# Patient Record
Sex: MALE | HISPANIC OR LATINO | Employment: OTHER | URBAN - METROPOLITAN AREA
[De-identification: names, ages, dates, MRNs, and addresses within clinical notes are randomized per-mention and may not be internally consistent; named-entity substitution may affect disease eponyms.]

---

## 2017-03-13 ENCOUNTER — OFFICE VISIT (OUTPATIENT)
Dept: FAMILY MEDICINE | Facility: CLINIC | Age: 42
End: 2017-03-13
Payer: COMMERCIAL

## 2017-03-13 VITALS
BODY MASS INDEX: 23.13 KG/M2 | HEART RATE: 73 BPM | SYSTOLIC BLOOD PRESSURE: 108 MMHG | HEIGHT: 75 IN | TEMPERATURE: 98.2 F | WEIGHT: 186 LBS | RESPIRATION RATE: 20 BRPM | DIASTOLIC BLOOD PRESSURE: 69 MMHG

## 2017-03-13 DIAGNOSIS — R05.9 COUGH: ICD-10-CM

## 2017-03-13 DIAGNOSIS — J02.9 VIRAL PHARYNGITIS: Primary | ICD-10-CM

## 2017-03-13 LAB
DEPRECATED S PYO AG THROAT QL EIA: NORMAL
MICRO REPORT STATUS: NORMAL
SPECIMEN SOURCE: NORMAL

## 2017-03-13 PROCEDURE — 99203 OFFICE O/P NEW LOW 30 MIN: CPT | Performed by: FAMILY MEDICINE

## 2017-03-13 PROCEDURE — 87880 STREP A ASSAY W/OPTIC: CPT | Performed by: FAMILY MEDICINE

## 2017-03-13 PROCEDURE — 87081 CULTURE SCREEN ONLY: CPT | Performed by: FAMILY MEDICINE

## 2017-03-13 RX ORDER — BENZONATATE 100 MG/1
100 CAPSULE ORAL 3 TIMES DAILY PRN
Qty: 42 CAPSULE | Refills: 0 | Status: SHIPPED | OUTPATIENT
Start: 2017-03-13 | End: 2017-07-25

## 2017-03-13 NOTE — PROGRESS NOTES
"  SUBJECTIVE:                                                    Virginia Presley is a 41 year old male who presents to clinic today for the following health issues:      Acute Illness   Acute illness concerns: cough and chest congestion, ST  Onset: x1 week    Fever: YES    Chills/Sweats: YES    Headache (location?): YES    Sinus Pressure:YES    Conjunctivitis:  no    Ear Pain: no    Rhinorrhea: no    Congestion: YES    Sore Throat: YES     Cough: YES    Wheeze: no    Decreased Appetite: no    Nausea: no    Vomiting: no    Diarrhea:  no    Dysuria/Freq.: no    Fatigue/Achiness: YES    Sick/Strep Exposure: no     Therapies Tried and outcome: cold medicine, vicks     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       Problem list and histories reviewed & adjusted, as indicated.  Additional history: as documented    There is no problem list on file for this patient.    Past Surgical History   Procedure Laterality Date     Wrist surgery       Tonsillectomy       Back surgery       fusion - 6- 8 yrs ago       Social History   Substance Use Topics     Smoking status: Never Smoker     Smokeless tobacco: Never Used     Alcohol use No     Family History   Problem Relation Age of Onset     Lymphoma Father 85           Reviewed and updated as needed this visit by clinical staff  Tobacco  Allergies  Soc Hx      Reviewed and updated as needed this visit by Provider         ROS:  Constitutional, HEENT, cardiovascular, pulmonary, gi and gu systems are negative, except as otherwise noted.    OBJECTIVE:                                                    /69 (BP Location: Right arm, Patient Position: Chair, Cuff Size: Adult Large)  Pulse 73  Temp 98.2  F (36.8  C) (Oral)  Resp 20  Ht 6' 3\" (1.905 m)  Wt 186 lb (84.4 kg)  BMI 23.25 kg/m2  Body mass index is 23.25 kg/(m^2).  GENERAL: healthy, alert and no distress  HENT: ear canals and TM's normal, nose and mouth without ulcers or lesions  RESP: lungs clear to " auscultation - no rales, rhonchi or wheezes  CV: regular rate and rhythm, normal S1 S2, no S3 or S4, no murmur, click or rub, no peripheral edema and peripheral pulses strong    Diagnostic Test Results:  Results for orders placed or performed in visit on 03/13/17 (from the past 24 hour(s))   Rapid strep screen   Result Value Ref Range    Specimen Description Throat     Rapid Strep A Screen       NEGATIVE: No Group A streptococcal antigen detected by immunoassay, await   culture report.      Micro Report Status FINAL 03/13/2017         ASSESSMENT/PLAN:                                                        1. Viral pharyngitis  - Rapid strep screen- negative.   - Beta strep group A culture    2. Cough  - will start on   - benzonatate (TESSALON) 100 MG capsule; Take 1 capsule (100 mg) by mouth 3 times daily as needed  Dispense: 42 capsule; Refill: 0    See Patient Instructions    Elizabet Alejandro MD  [facility]

## 2017-03-13 NOTE — PATIENT INSTRUCTIONS
Increase fluids  Humidifier use at night  Recommend allergy tablet daily such as claritin, zyrtec or allegra  Follow up as needed  Viral Upper Respiratory Illness (Adult)  You have a viral upper respiratory illness (URI), which is another term for the common cold. This illness is contagious during the first few days. It is spread through the air by coughing and sneezing. It may also be spread by direct contact (touching the sick person and then touching your own eyes, nose, or mouth). Frequent handwashing will decrease risk of spread. Most viral illnesses go away within 7 to 10 days with rest and simple home remedies. Sometimes the illness may last for several weeks. Antibiotics will not kill a virus, and they are generally not prescribed for this condition.    Home care    If symptoms are severe, rest at home for the first 2 to 3 days. When you resume activity, don't let yourself get too tired.    Avoid being exposed to cigarette smoke (yours or others ).    You may use acetaminophen or ibuprofen to control pain and fever, unless another medicine was prescribed. (Note: If you have chronic liver or kidney disease, have ever had a stomach ulcer or gastrointestinal bleeding, or are taking blood-thinning medicines, talk with your healthcare provider before using these medicines.) Aspirin should never be given to anyone under 18 years of age who is ill with a viral infection or fever. It may cause severe liver or brain damage.    Your appetite may be poor, so a light diet is fine. Avoid dehydration by drinking 6 to 8 glasses of fluids per day (water, soft drinks, juices, tea, or soup). Extra fluids will help loosen secretions in the nose and lungs.    Over-the-counter cold medicines will not shorten the length of time you re sick, but they may be helpful for the following symptoms: cough, sore throat, and nasal and sinus congestion. (Note: Do not use decongestants if you have high blood pressure.)  Follow-up  care  Follow up with your healthcare provider, or as advised.  When to seek medical advice  Call your healthcare provider right away if any of these occur:    Cough with lots of colored sputum (mucus)    Severe headache; face, neck, or ear pain    Difficulty swallowing due to throat pain    Fever of 100.4 F (38 C)  Call 911, or get immediate medical care  Call emergency services right away if any of these occur:    Chest pain, shortness of breath, wheezing, or difficulty breathing    Coughing up blood    Inability to swallow due to throat pain    3621-0162 [employer]. [address] [place] [address]184. All rights reserved. This information is not intended as a substitute for professional medical care. Always follow your healthcare professional's instructions.

## 2017-03-13 NOTE — MR AVS SNAPSHOT
After Visit Summary   3/13/2017    Virginia Presley    MRN: [id number]           Patient Information     Date Of Birth          1975        Visit Information        Provider Department      3/13/2017 2:15 PM Elizabet Alejandro MD [facility]        Today's Diagnoses     Viral pharyngitis    -  1    Cough          Care Instructions      Increase fluids  Humidifier use at night  Recommend allergy tablet daily such as claritin, zyrtec or allegra  Follow up as needed  Viral Upper Respiratory Illness (Adult)  You have a viral upper respiratory illness (URI), which is another term for the common cold. This illness is contagious during the first few days. It is spread through the air by coughing and sneezing. It may also be spread by direct contact (touching the sick person and then touching your own eyes, nose, or mouth). Frequent handwashing will decrease risk of spread. Most viral illnesses go away within 7 to 10 days with rest and simple home remedies. Sometimes the illness may last for several weeks. Antibiotics will not kill a virus, and they are generally not prescribed for this condition.    Home care    If symptoms are severe, rest at home for the first 2 to 3 days. When you resume activity, don't let yourself get too tired.    Avoid being exposed to cigarette smoke (yours or others ).    You may use acetaminophen or ibuprofen to control pain and fever, unless another medicine was prescribed. (Note: If you have chronic liver or kidney disease, have ever had a stomach ulcer or gastrointestinal bleeding, or are taking blood-thinning medicines, talk with your healthcare provider before using these medicines.) Aspirin should never be given to anyone under 18 years of age who is ill with a viral infection or fever. It may cause severe liver or brain damage.    Your appetite may be poor, so a light diet is fine. Avoid dehydration by drinking 6 to 8 glasses of fluids per day  (water, soft drinks, juices, tea, or soup). Extra fluids will help loosen secretions in the nose and lungs.    Over-the-counter cold medicines will not shorten the length of time you re sick, but they may be helpful for the following symptoms: cough, sore throat, and nasal and sinus congestion. (Note: Do not use decongestants if you have high blood pressure.)  Follow-up care  Follow up with your healthcare provider, or as advised.  When to seek medical advice  Call your healthcare provider right away if any of these occur:    Cough with lots of colored sputum (mucus)    Severe headache; face, neck, or ear pain    Difficulty swallowing due to throat pain    Fever of 100.4 F (38 C)  Call 911, or get immediate medical care  Call emergency services right away if any of these occur:    Chest pain, shortness of breath, wheezing, or difficulty breathing    Coughing up blood    Inability to swallow due to throat pain    6959-9853 [employer]. [address] [place] [address]. All rights reserved. This information is not intended as a substitute for professional medical care. Always follow your healthcare professional's instructions.              Follow-ups after your visit        Who to contact     If you have questions or need follow up information about today's clinic visit or your schedule please contact [facility] directly at [phone].  Normal or non-critical lab and imaging results will be communicated to you by MyChart, letter or phone within 4 business days after the clinic has received the results. If you do not hear from us within 7 days, please contact the clinic through MyChart or phone. If you have a critical or abnormal lab result, we will notify you by phone as soon as possible.  Submit refill requests through [employer] or call your pharmacy and they will forward the refill request to us. Please allow 3 business days for your refill to be completed.           "Additional Information About Your Visit        MyChart Information     [email or web address] lets you send messages to your doctor, view your test results, renew your prescriptions, schedule appointments and more. To sign up, go to [email or web address] . Click on \"Log in\" on the left side of the screen, which will take you to the Welcome page. Then click on \"Sign up Now\" on the right side of the page.     You will be asked to enter the access code listed below, as well as some personal information. Please follow the directions to create your username and password.     Your access code is: [email or web address]  Expires: 2017  3:03 PM     Your access code will  in 90 days. If you need help or a new code, please call your [place] clinic or [phone].        Care EveryWhere ID     This is your Care EveryWhere ID. This could be used by other organizations to access your [place] medical records  [id number]        Your Vitals Were     Pulse Temperature Respirations Height BMI (Body Mass Index)       73 98.2  F (36.8  C) (Oral) 20 6' 3\" (1.905 m) 23.25 kg/m2        Blood Pressure from Last 3 Encounters:   17 108/69    Weight from Last 3 Encounters:   17 186 lb (84.4 kg)              We Performed the Following     Beta strep group A culture     Rapid strep screen          Today's Medication Changes          These changes are accurate as of: 3/13/17  3:03 PM.  If you have any questions, ask your nurse or doctor.               Start taking these medicines.        Dose/Directions    benzonatate 100 MG capsule   Commonly known as:  TESSALON   Used for:  Cough   Started by:  Elizabet Alejandro MD        Dose:  100 mg   Take 1 capsule (100 mg) by mouth 3 times daily as needed   Quantity:  42 capsule   Refills:  0            Where to get your medicines      These medications were sent to [facility] [place] [place] [id number] [place] [id number]     Phone:  " [phone]     benzonatate 100 MG capsule                Primary Care Provider    None Specified       No primary provider on file.        Thank you!     Thank you for choosing [facility]  for your care. Our goal is always to provide you with excellent care. Hearing back from our patients is one way we can continue to improve our services. Please take a few minutes to complete the written survey that you may receive in the mail after your visit with us. Thank you!             Your Updated Medication List - Protect others around you: Learn how to safely use, store and throw away your medicines at www.disposemymeds.org.          This list is accurate as of: 3/13/17  3:03 PM.  Always use your most recent med list.                   Brand Name Dispense Instructions for use    benzonatate 100 MG capsule    TESSALON    42 capsule    Take 1 capsule (100 mg) by mouth 3 times daily as needed

## 2017-03-13 NOTE — NURSING NOTE
"Chief Complaint   Patient presents with     URI     uri symptoms x1 week, c/o cough and chest congestion, ST, and fever     Initial /69 (BP Location: Right arm, Patient Position: Chair, Cuff Size: Adult Large)  Pulse 73  Temp 98.2  F (36.8  C) (Oral)  Resp 20  Ht 6' 3\" (1.905 m)  Wt 186 lb (84.4 kg)  BMI 23.25 kg/m2 Estimated body mass index is 23.25 kg/(m^2) as calculated from the following:    Height as of this encounter: 6' 3\" (1.905 m).    Weight as of this encounter: 186 lb (84.4 kg)..  BP completed using cuff size Large.            Chyna Serrano/DARRELL    "

## 2017-03-15 LAB
BACTERIA SPEC CULT: NORMAL
MICRO REPORT STATUS: NORMAL
SPECIMEN SOURCE: NORMAL

## 2017-07-25 ENCOUNTER — OFFICE VISIT (OUTPATIENT)
Dept: FAMILY MEDICINE | Facility: CLINIC | Age: 42
End: 2017-07-25
Payer: COMMERCIAL

## 2017-07-25 ENCOUNTER — RADIANT APPOINTMENT (OUTPATIENT)
Dept: GENERAL RADIOLOGY | Facility: CLINIC | Age: 42
End: 2017-07-25
Attending: PHYSICIAN ASSISTANT
Payer: COMMERCIAL

## 2017-07-25 VITALS
TEMPERATURE: 98.2 F | DIASTOLIC BLOOD PRESSURE: 76 MMHG | HEART RATE: 74 BPM | WEIGHT: 185 LBS | RESPIRATION RATE: 14 BRPM | BODY MASS INDEX: 23.12 KG/M2 | SYSTOLIC BLOOD PRESSURE: 114 MMHG

## 2017-07-25 DIAGNOSIS — Z00.00 VISIT FOR PREVENTIVE HEALTH EXAMINATION: Primary | ICD-10-CM

## 2017-07-25 DIAGNOSIS — K64.4 EXTERNAL HEMORRHOIDS: ICD-10-CM

## 2017-07-25 DIAGNOSIS — R10.30 LOWER ABDOMINAL PAIN: ICD-10-CM

## 2017-07-25 DIAGNOSIS — K21.9 GASTROESOPHAGEAL REFLUX DISEASE, ESOPHAGITIS PRESENCE NOT SPECIFIED: ICD-10-CM

## 2017-07-25 DIAGNOSIS — K92.1 HEMATOCHEZIA: ICD-10-CM

## 2017-07-25 PROBLEM — M54.50 CHRONIC LOW BACK PAIN: Status: ACTIVE | Noted: 2017-07-25

## 2017-07-25 PROBLEM — K59.03 DRUG-INDUCED CONSTIPATION: Status: ACTIVE | Noted: 2017-07-25

## 2017-07-25 PROBLEM — G89.29 CHRONIC LOW BACK PAIN: Status: ACTIVE | Noted: 2017-07-25

## 2017-07-25 PROBLEM — K64.9 HEMORRHOIDS: Status: ACTIVE | Noted: 2017-07-25

## 2017-07-25 PROBLEM — M54.42 BILATERAL LOW BACK PAIN WITH LEFT-SIDED SCIATICA: Status: ACTIVE | Noted: 2017-07-25

## 2017-07-25 PROBLEM — Z80.0 FAMILY HISTORY OF COLON CANCER: Status: ACTIVE | Noted: 2017-07-25

## 2017-07-25 LAB
ALBUMIN SERPL-MCNC: 4.3 G/DL (ref 3.4–5)
ALBUMIN UR-MCNC: NEGATIVE MG/DL
ALP SERPL-CCNC: 73 U/L (ref 40–150)
ALT SERPL W P-5'-P-CCNC: 26 U/L (ref 0–70)
ANION GAP SERPL CALCULATED.3IONS-SCNC: 6 MMOL/L (ref 3–14)
APPEARANCE UR: CLEAR
AST SERPL W P-5'-P-CCNC: 16 U/L (ref 0–45)
BASOPHILS # BLD AUTO: 0 10E9/L (ref 0–0.2)
BASOPHILS NFR BLD AUTO: 0.3 %
BILIRUB SERPL-MCNC: 0.7 MG/DL (ref 0.2–1.3)
BILIRUB UR QL STRIP: NEGATIVE
BUN SERPL-MCNC: 14 MG/DL (ref 7–30)
CALCIUM SERPL-MCNC: 9.3 MG/DL (ref 8.5–10.1)
CHLORIDE SERPL-SCNC: 103 MMOL/L (ref 94–109)
CHOLEST SERPL-MCNC: 186 MG/DL
CO2 SERPL-SCNC: 31 MMOL/L (ref 20–32)
COLOR UR AUTO: YELLOW
CREAT SERPL-MCNC: 0.87 MG/DL (ref 0.66–1.25)
DIFFERENTIAL METHOD BLD: NORMAL
EOSINOPHIL # BLD AUTO: 0.1 10E9/L (ref 0–0.7)
EOSINOPHIL NFR BLD AUTO: 1 %
ERYTHROCYTE [DISTWIDTH] IN BLOOD BY AUTOMATED COUNT: 13.7 % (ref 10–15)
GFR SERPL CREATININE-BSD FRML MDRD: ABNORMAL ML/MIN/1.7M2
GLUCOSE SERPL-MCNC: 100 MG/DL (ref 70–99)
GLUCOSE UR STRIP-MCNC: NEGATIVE MG/DL
HCT VFR BLD AUTO: 47.3 % (ref 40–53)
HDLC SERPL-MCNC: 50 MG/DL
HGB BLD-MCNC: 16.3 G/DL (ref 13.3–17.7)
HGB UR QL STRIP: NEGATIVE
KETONES UR STRIP-MCNC: NEGATIVE MG/DL
LDLC SERPL CALC-MCNC: 122 MG/DL
LEUKOCYTE ESTERASE UR QL STRIP: NEGATIVE
LYMPHOCYTES # BLD AUTO: 1.6 10E9/L (ref 0.8–5.3)
LYMPHOCYTES NFR BLD AUTO: 23.3 %
MCH RBC QN AUTO: 32.5 PG (ref 26.5–33)
MCHC RBC AUTO-ENTMCNC: 34.5 G/DL (ref 31.5–36.5)
MCV RBC AUTO: 94 FL (ref 78–100)
MONOCYTES # BLD AUTO: 0.6 10E9/L (ref 0–1.3)
MONOCYTES NFR BLD AUTO: 8 %
NEUTROPHILS # BLD AUTO: 4.7 10E9/L (ref 1.6–8.3)
NEUTROPHILS NFR BLD AUTO: 67.4 %
NITRATE UR QL: NEGATIVE
NONHDLC SERPL-MCNC: 136 MG/DL
PH UR STRIP: 6 PH (ref 5–7)
PLATELET # BLD AUTO: 223 10E9/L (ref 150–450)
POTASSIUM SERPL-SCNC: 4.4 MMOL/L (ref 3.4–5.3)
PROT SERPL-MCNC: 8 G/DL (ref 6.8–8.8)
RBC # BLD AUTO: 5.01 10E12/L (ref 4.4–5.9)
SODIUM SERPL-SCNC: 140 MMOL/L (ref 133–144)
SP GR UR STRIP: 1.02 (ref 1–1.03)
TRIGL SERPL-MCNC: 69 MG/DL
URN SPEC COLLECT METH UR: NORMAL
UROBILINOGEN UR STRIP-ACNC: 0.2 EU/DL (ref 0.2–1)
WBC # BLD AUTO: 7 10E9/L (ref 4–11)

## 2017-07-25 PROCEDURE — 36415 COLL VENOUS BLD VENIPUNCTURE: CPT | Performed by: PHYSICIAN ASSISTANT

## 2017-07-25 PROCEDURE — 99213 OFFICE O/P EST LOW 20 MIN: CPT | Mod: 25 | Performed by: PHYSICIAN ASSISTANT

## 2017-07-25 PROCEDURE — 74020 XR ABDOMEN 2 VW: CPT

## 2017-07-25 PROCEDURE — 99396 PREV VISIT EST AGE 40-64: CPT | Performed by: PHYSICIAN ASSISTANT

## 2017-07-25 PROCEDURE — 85025 COMPLETE CBC W/AUTO DIFF WBC: CPT | Performed by: PHYSICIAN ASSISTANT

## 2017-07-25 PROCEDURE — 81003 URINALYSIS AUTO W/O SCOPE: CPT | Performed by: PHYSICIAN ASSISTANT

## 2017-07-25 PROCEDURE — 80053 COMPREHEN METABOLIC PANEL: CPT | Performed by: PHYSICIAN ASSISTANT

## 2017-07-25 PROCEDURE — 80061 LIPID PANEL: CPT | Performed by: PHYSICIAN ASSISTANT

## 2017-07-25 RX ORDER — ACETAMINOPHEN 325 MG/1
650 TABLET ORAL EVERY 4 HOURS PRN
Status: ON HOLD | COMMUNITY
End: 2024-02-06

## 2017-07-25 NOTE — MR AVS SNAPSHOT
After Visit Summary   7/25/2017    Virginia Presley    MRN: [id number]           Patient Information     Date Of Birth          1975        Visit Information        Provider Department      7/25/2017 8:15 AM Lanre Lang PA-C [facility]        Today's Diagnoses     Visit for preventive health examination    -  1    Lower abdominal pain        External hemorrhoids        Hematochezia        Gastroesophageal reflux disease, esophagitis presence not specified          Care Instructions    *recommending metamucil daily and current daily miralax until symptoms resolve*      Preventive Health Recommendations  Male Ages 40 to 49    Yearly exam:             See your health care provider every year in order to  o   Review health changes.   o   Discuss preventive care.    o   Review your medicines if your doctor has prescribed any.    You should be tested each year for STDs (sexually transmitted diseases) if you re at risk.     Have a cholesterol test every 5 years.     Have a colonoscopy (test for colon cancer) if someone in your family has had colon cancer or polyps before age 50.     After age 45, have a diabetes test (fasting glucose). If you are at risk for diabetes, you should have this test every 3 years.      Talk with your health care provider about whether or not a prostate cancer screening test (PSA) is right for you.    Shots: Get a flu shot each year. Get a tetanus shot every 10 years.     Nutrition:    Eat at least 5 servings of fruits and vegetables daily.     Eat whole-grain bread, whole-wheat pasta and brown rice instead of white grains and rice.     Talk to your provider about Calcium and Vitamin D.     Lifestyle    Exercise for at least 150 minutes a week (30 minutes a day, 5 days a week). This will help you control your weight and prevent disease.     Limit alcohol to one drink per day.     No smoking.     Wear sunscreen to prevent skin cancer.     See your  dentist every six months for an exam and cleaning.      * Abdominal Pain,Uncertain Cause [Male]  Based on your visit today, the exact cause of your abdominal (stomach) pain is not clear. Your exam and tests do not indicate a dangerous cause at this time. However, the signs of a serious problem may take more time to appear. Although your exam was reassuring today, sometimes early in the course of many conditions, exam and lab tests can appear normal. Therefore, it is important for you to watch for any new symptoms or worsening of your condition.  Causes:  It may not be obvious what caused your symptoms. Pay attention to things that do seem to make your symptoms worse or better and discuss this with your doctor when you follow up.  Diagnosis:  The evaluation of abdominal pain in the emergency department may only require an exam by the doctor or it may include blood, urine or imaging studies, depending on many factors. Sometimes exams and tests can identify a cause but in many cases, a clear cause is not found. Further testing at follow up visits may help to suggest a clear diagnosis.  Home Care:    Rest as much as possible until your next exam.    Try to avoid any medications (unless otherwise directed by your doctor), foods, activities, or other factors that you may have contributed to your symptoms.    Try to eat foods that you know that you have tolerated well in the past. Certain diets may be recommended for some conditions that cause abdominal pain. However, since the cause of your symptoms may not be clear, discuss your diet more with your primary care provider or specialist for further recommendations.     Eating several small meals per day as opposed to 2 or 3 larger meals may help.    Monitor closely for anything that may make your symptoms worse or better. Pay close attention to symptoms below that may indicate worsening of your condition.  Follow Up And Precautions:  See your doctor or this facility as  instructed (or sooner, if your symptoms are not improving). In some cases, you may need more testing.  Contact Your Doctor Or Seek Medical Attention  if any of the following occur:    Pain is becoming worse    You are unable to take your medications because of too much vomiting    Swelling of the abdomen    Fever of 101 F (38.3 C) or higher, or as directed by your health care provider    Blood in vomit or bowel movements (dark red or black color)    Jaundice (yellow color of eyes and skin)    New onset of weakness, dizziness or fainting    New onset of chest, arm, back, neck or jaw pain    3860-8257 [employer] [facility], [address] [place] [address]. All rights reserved. This information is not intended as a substitute for professional medical care. Always follow your healthcare professional's instructions.                Follow-ups after your visit        Additional Services     COLORECTAL SURGERY REFERRAL       Your provider has referred you to: N: [facility] [phone]   [email or web address]    Referral Reason(s): Hemorrhoids and Rectal Bleeding  Special Concerns: None  This referral is: Urgent (24 - 72 hours)  It is OK to leave a message on patient's voicemail.    Please be aware that coverage of these services is subject to the terms and limitations of your health insurance plan.  Call member services at your health plan with any benefit or coverage questions.      Please bring the following with you to your appointment:    (1) Any X-Rays, CTs or MRIs which have been performed.  Contact the facility where they were done to arrange for  prior to your scheduled appointment.    (2) List of current medications  (3) This referral request   (4) Any documents/labs given to you for this referral                  Who to contact     If you have questions or need follow up information about today's clinic visit or your schedule please contact [place]  [facility] directly at [phone].  Normal or non-critical lab and imaging results will be communicated to you by MyChart, letter or phone within 4 business days after the clinic has received the results. If you do not hear from us within 7 days, please contact the clinic through MyChart or phone. If you have a critical or abnormal lab result, we will notify you by phone as soon as possible.  Submit refill requests through [employer] or call your pharmacy and they will forward the refill request to us. Please allow 3 business days for your refill to be completed.          Additional Information About Your Visit        [employer] Information     [employer] lets you send messages to your doctor, view your test results, renew your prescriptions, schedule appointments and more. To sign up, go to [email or web address] . Click on \"Log in\" on the left side of the screen, which will take you to the Welcome page. Then click on \"Sign up Now\" on the right side of the page.     You will be asked to enter the access code listed below, as well as some personal information. Please follow the directions to create your username and password.     Your access code is: [email or web address]  Expires: 10/23/2017  9:08 AM     Your access code will  in 90 days. If you need help or a new code, please call your [place] clinic or [phone].        Care EveryWhere ID     This is your Care EveryWhere ID. This could be used by other organizations to access your [place] medical records  [id number]        Your Vitals Were     Pulse Temperature Respirations BMI (Body Mass Index)          74 98.2  F (36.8  C) (Oral) 14 23.12 kg/m2         Blood Pressure from Last 3 Encounters:   17 114/76   17 108/69    Weight from Last 3 Encounters:   17 185 lb (83.9 kg)   17 186 lb (84.4 kg)              We Performed the Following     *UA reflex to Microscopic and Culture ([place] [facility] (except [place] and " [place])     CBC with platelets and differential     COLORECTAL SURGERY REFERRAL     Comprehensive metabolic panel (BMP + Alb, Alk Phos, ALT, AST, Total. Bili, TP)     Lipid panel reflex to direct LDL          Today's Medication Changes          These changes are accurate as of: 7/25/17  9:08 AM.  If you have any questions, ask your nurse or doctor.               Start taking these medicines.        Dose/Directions    omeprazole 20 MG CR capsule   Commonly known as:  priLOSEC   Used for:  Gastroesophageal reflux disease, esophagitis presence not specified   Started by:  Lanre Lang PA-C        Dose:  20 mg   Take 1 capsule (20 mg) by mouth daily   Quantity:  30 capsule   Refills:  1            Where to get your medicines      [place] [facility] [address]  [address] [facility] [address]     Phone:  [phone]     omeprazole 20 MG CR capsule                Primary Care Provider    None Specified       No primary provider on file.        Equal Access to Services     CED MALCOLM : Hadii falguni kahn Solula, waaxda lusinai, qaybta kaalmada adedanishyabrijesh, mari leonard . So [facility] [phone].    ATENCIÓN: Si habla español, tiene a olivares disposición servicios gratuitos de asistencia lingüística. Llame al [phone].    We comply with applicable federal civil rights laws and [place] laws. We do not discriminate on the basis of race, color, national origin, age, disability sex, sexual orientation or gender identity.            Thank you!     Thank you for choosing [facility]  for your care. Our goal is always to provide you with excellent care. Hearing back from our patients is one way we can continue to improve our services. Please take a few minutes to complete the written survey that you may receive in the mail after your visit with us. Thank you!             Your Updated Medication  List - Protect others around you: Learn how to safely use, store and throw away your medicines at www.disposemymeds.org.          This list is accurate as of: 7/25/17  9:08 AM.  Always use your most recent med list.                   Brand Name Dispense Instructions for use Diagnosis    omeprazole 20 MG CR capsule    priLOSEC    30 capsule    Take 1 capsule (20 mg) by mouth daily    Gastroesophageal reflux disease, esophagitis presence not specified       TYLENOL PO

## 2017-07-25 NOTE — PATIENT INSTRUCTIONS
*recommending metamucil daily and current daily miralax until symptoms resolve*      Preventive Health Recommendations  Male Ages 40 to 49    Yearly exam:             See your health care provider every year in order to  o   Review health changes.   o   Discuss preventive care.    o   Review your medicines if your doctor has prescribed any.    You should be tested each year for STDs (sexually transmitted diseases) if you re at risk.     Have a cholesterol test every 5 years.     Have a colonoscopy (test for colon cancer) if someone in your family has had colon cancer or polyps before age 50.     After age 45, have a diabetes test (fasting glucose). If you are at risk for diabetes, you should have this test every 3 years.      Talk with your health care provider about whether or not a prostate cancer screening test (PSA) is right for you.    Shots: Get a flu shot each year. Get a tetanus shot every 10 years.     Nutrition:    Eat at least 5 servings of fruits and vegetables daily.     Eat whole-grain bread, whole-wheat pasta and brown rice instead of white grains and rice.     Talk to your provider about Calcium and Vitamin D.     Lifestyle    Exercise for at least 150 minutes a week (30 minutes a day, 5 days a week). This will help you control your weight and prevent disease.     Limit alcohol to one drink per day.     No smoking.     Wear sunscreen to prevent skin cancer.     See your dentist every six months for an exam and cleaning.      * Abdominal Pain,Uncertain Cause [Male]  Based on your visit today, the exact cause of your abdominal (stomach) pain is not clear. Your exam and tests do not indicate a dangerous cause at this time. However, the signs of a serious problem may take more time to appear. Although your exam was reassuring today, sometimes early in the course of many conditions, exam and lab tests can appear normal. Therefore, it is important for you to watch for any new symptoms or worsening of your  condition.  Causes:  It may not be obvious what caused your symptoms. Pay attention to things that do seem to make your symptoms worse or better and discuss this with your doctor when you follow up.  Diagnosis:  The evaluation of abdominal pain in the emergency department may only require an exam by the doctor or it may include blood, urine or imaging studies, depending on many factors. Sometimes exams and tests can identify a cause but in many cases, a clear cause is not found. Further testing at follow up visits may help to suggest a clear diagnosis.  Home Care:    Rest as much as possible until your next exam.    Try to avoid any medications (unless otherwise directed by your doctor), foods, activities, or other factors that you may have contributed to your symptoms.    Try to eat foods that you know that you have tolerated well in the past. Certain diets may be recommended for some conditions that cause abdominal pain. However, since the cause of your symptoms may not be clear, discuss your diet more with your primary care provider or specialist for further recommendations.     Eating several small meals per day as opposed to 2 or 3 larger meals may help.    Monitor closely for anything that may make your symptoms worse or better. Pay close attention to symptoms below that may indicate worsening of your condition.  Follow Up And Precautions:  See your doctor or this facility as instructed (or sooner, if your symptoms are not improving). In some cases, you may need more testing.  Contact Your Doctor Or Seek Medical Attention  if any of the following occur:    Pain is becoming worse    You are unable to take your medications because of too much vomiting    Swelling of the abdomen    Fever of 101 F (38.3 C) or higher, or as directed by your health care provider    Blood in vomit or bowel movements (dark red or black color)    Jaundice (yellow color of eyes and skin)    New onset of weakness, dizziness or  fainting    New onset of chest, arm, back, neck or jaw pain    [id number] [facility], [address] [place] [address], [place] [address]. All rights reserved. This information is not intended as a substitute for professional medical care. Always follow your healthcare professional's instructions.

## 2017-07-25 NOTE — LETTER
Yordy Presley  [address] [facility] [address]        July 26, 2017        Mr. Presley,    The results from your recent labs have all returned.     Your complete blood count and urine test were normal.     Your kidney function, liver function, and electrolytes were normal.     Your blood sugar and cholesterol where just slightly higher than ideal, but not in the range where medication is needed. I recommend improved diet and exercise changes where possible. A low fat, carbohydrate-controlled diet should be adopted. Saturated fat should not make up more than 7% of your total daily calories, carbohydrates should be restricted to 50% to 60% of daily calories, and simple sugars like sucrose should be avoided. You may also consider increasing your intake of oily fish by either an increase in your fish intake or a fish oil supplement to at least 2 servings per week. Alcohol should also be limited and at least 30 minutes of aerobic exercise 5 days a week would be beneficial to implement.     -Mateus Lang, PAC    Resulted Orders   Lipid panel reflex to direct LDL   Result Value Ref Range    Cholesterol 186 <200 mg/dL    Triglycerides 69 <150 mg/dL      Comment:      Fasting specimen    HDL Cholesterol 50 >39 mg/dL    LDL Cholesterol Calculated 122 (H) <100 mg/dL      Comment:      Above desirable:  100-129 mg/dl   Borderline High:  130-159 mg/dL   High:             160-189 mg/dL   Very high:       >189 mg/dl      Non HDL Cholesterol 136 (H) <130 mg/dL      Comment:      Above Desirable:  130-159 mg/dl   Borderline high:  160-189 mg/dl   High:             190-219 mg/dl   Very high:       >219 mg/dl     CBC with platelets and differential   Result Value Ref Range    WBC 7.0 4.0 - 11.0 10e9/L    RBC Count 5.01 4.4 - 5.9 10e12/L    Hemoglobin 16.3 13.3 - 17.7 g/dL    Hematocrit 47.3 40.0 - 53.0 %    MCV 94 78 - 100 fl    MCH 32.5 26.5 - 33.0 pg    MCHC 34.5 31.5 - 36.5 g/dL    RDW 13.7 10.0 - 15.0 %     Platelet Count 223 150 - 450 10e9/L    Diff Method Automated Method     % Neutrophils 67.4 %    % Lymphocytes 23.3 %    % Monocytes 8.0 %    % Eosinophils 1.0 %    % Basophils 0.3 %    Absolute Neutrophil 4.7 1.6 - 8.3 10e9/L    Absolute Lymphocytes 1.6 0.8 - 5.3 10e9/L    Absolute Monocytes 0.6 0.0 - 1.3 10e9/L    Absolute Eosinophils 0.1 0.0 - 0.7 10e9/L    Absolute Basophils 0.0 0.0 - 0.2 10e9/L   Comprehensive metabolic panel (BMP + Alb, Alk Phos, ALT, AST, Total. Bili, TP)   Result Value Ref Range    Sodium 140 133 - 144 mmol/L    Potassium 4.4 3.4 - 5.3 mmol/L    Chloride 103 94 - 109 mmol/L    Carbon Dioxide 31 20 - 32 mmol/L    Anion Gap 6 3 - 14 mmol/L    Glucose 100 (H) 70 - 99 mg/dL      Comment:      Fasting specimen    Urea Nitrogen 14 7 - 30 mg/dL    Creatinine 0.87 0.66 - 1.25 mg/dL    GFR Estimate >90  Non  GFR Calc   >60 mL/min/1.7m2    GFR Estimate If Black >90   GFR Calc   >60 mL/min/1.7m2    Calcium 9.3 8.5 - 10.1 mg/dL    Bilirubin Total 0.7 0.2 - 1.3 mg/dL    Albumin 4.3 3.4 - 5.0 g/dL    Protein Total 8.0 6.8 - 8.8 g/dL    Alkaline Phosphatase 73 40 - 150 U/L    ALT 26 0 - 70 U/L    AST 16 0 - 45 U/L   *UA reflex to Microscopic and Culture ([facility] (except [place] and [place])   Result Value Ref Range    Color Urine Yellow     Appearance Urine Clear     Glucose Urine Negative NEG mg/dL    Bilirubin Urine Negative NEG    Ketones Urine Negative NEG mg/dL    Specific Gravity Urine 1.020 1.003 - 1.035    Blood Urine Negative NEG    pH Urine 6.0 5.0 - 7.0 pH    Protein Albumin Urine Negative NEG mg/dL    Urobilinogen Urine 0.2 0.2 - 1.0 EU/dL    Nitrite Urine Negative NEG    Leukocyte Esterase Urine Negative NEG    Source Midstream Urine

## 2017-07-25 NOTE — NURSING NOTE
"  Chief Complaint   Patient presents with     Physical     fasting labs     Headache     Abdominal Pain       Initial /76 (BP Location: Right arm, Patient Position: Chair, Cuff Size: Adult Large)  Pulse 74  Temp 98.2  F (36.8  C) (Oral)  Resp 14  Wt 185 lb (83.9 kg)  BMI 23.12 kg/m2 Estimated body mass index is 23.12 kg/(m^2) as calculated from the following:    Height as of 3/13/17: 6' 3\" (1.905 m).    Weight as of this encounter: 185 lb (83.9 kg).  Medication Reconciliation: complete        SONA Rangel    "

## 2017-07-25 NOTE — PROGRESS NOTES
SUBJECTIVE:   CC: Virginia Presley is an 42 year old male who presents for preventative health visit.     Healthy Habits:    Do you get at least three servings of calcium containing foods daily (dairy, green leafy vegetables, etc.)? yes    Amount of exercise or daily activities, outside of work: not outside of work    Problems taking medications regularly n/a    Medication side effects: n/a    Have you had an eye exam in the past two years? no    Do you see a dentist twice per year? yes  Do you have sleep apnea, excessive snoring or daytime drowsiness?no      ABDOMINAL   PAIN     Onset: x 3 - 4 days    Description:   Character: cramping  Location: lower abd pain  Radiation: None    Intensity: 6/10    Progression of Symptoms:  same    Accompanying Signs & Symptoms:  Fever/Chills?: YES- chills at pm  Gas/Bloating: YES  Nausea: YES  GERD: YES-states has history of PUD. Used to take omeprazole. Not currently taking this. Denies melena.   Vomitting: no   Diarrhea?: no   Constipation:YES-present 3 days ago. now resolved. States has BM every 1-2 days at baseline. patient noticed blood in stool x2 yesterday. Has history of hemorrhoids. Symptoms feel similar with mild pain with wiping, but blood seemed to be a larger amount. Patient has history of chronic low back pain managed through [facility]. History of spinal surgery 6-8 years ago. Takes chronic intermittent narcotics for pain.   Dysuria or Hematuria: no    History:   Trauma: no   Previous similar pain: none  Previous tests done: none    Precipitating factors:   Does the pain change with:     Food: no      BM: no     Urination: no       Therapies Tried and outcome: tylenol         Headache  Onset: x 3-4 days    Description:   Location: bilateral in the frontal area   Character: pressure    Intensity: mild    Progression of Symptoms:  constant    Accompanying Signs & Symptoms:  Stiff neck: no   Neck or upper back pain: no   Fever: no   Sinus pressure: no   Nausea or vomiting:  YES- nausea  Dizziness: no   Numbness: no   Weakness: no   Visual changes: no     History:   Head trauma: no   Family history of migraines: no   Previous tests for headaches: no   Neurologist evaluations: no   Able to do daily activities: YES  Wake with a headaches: YES  Do headaches wake you up: no   Daily pain medication use: YES- tylenol  Work/school stressors/changes: no     Precipitating factors:   Does light make it worse: YES  Does sound make it worse: YES    Alleviating factors:  Does sleep help: YES    Therapies Tried and outcome: tylenol          Today's PHQ-2 Score:   PHQ-2 ( 1999 [employer]) 7/25/2017 3/13/2017   Q1: Little interest or pleasure in doing things 0 0   Q2: Feeling down, depressed or hopeless 0 0   PHQ-2 Score 0 0         Abuse: Current or Past(Physical, Sexual or Emotional)- No  Do you feel safe in your environment - Yes  Social History   Substance Use Topics     Smoking status: Never Smoker     Smokeless tobacco: Never Used     Alcohol use No     The patient does not drink >3 drinks per day nor >7 drinks per week.    Last PSA: No results found for: PSA    Reviewed orders with patient. Reviewed health maintenance and updated orders accordingly - Yes  BP Readings from Last 3 Encounters:   07/25/17 114/76   03/13/17 108/69    Wt Readings from Last 3 Encounters:   07/25/17 185 lb (83.9 kg)   03/13/17 186 lb (84.4 kg)                  Patient Active Problem List   Diagnosis     Bilateral low back pain with left-sided sciatica     Drug-induced constipation     Hemorrhoids     Family history of colon cancer-father     Past Surgical History:   Procedure Laterality Date     BACK SURGERY      fusion - 6- 8 yrs ago     TONSILLECTOMY       WRIST SURGERY         Social History   Substance Use Topics     Smoking status: Never Smoker     Smokeless tobacco: Never Used     Alcohol use No     Family History   Problem Relation Age of Onset     Lymphoma Father 85     CANCER Father          Current Outpatient  Prescriptions   Medication Sig Dispense Refill     Acetaminophen (TYLENOL PO)        No lab results found.           Reviewed and updated as needed this visit by clinical staffTobacco  Allergies  Med Hx  Surg Hx  Fam Hx  Soc Hx        Reviewed and updated as needed this visit by Provider        Past Medical History:   Diagnosis Date     NO ACTIVE PROBLEMS       Past Surgical History:   Procedure Laterality Date     BACK SURGERY      fusion - 6- 8 yrs ago     TONSILLECTOMY       WRIST SURGERY         ROS: Other than above.   C: NEGATIVE for fever, chills, change in weight  I: NEGATIVE for worrisome rashes, moles or lesions  E: NEGATIVE for vision changes or irritation  ENT: NEGATIVE for ear, mouth and throat problems  R: NEGATIVE for significant cough or SOB  CV: NEGATIVE for chest pain, palpitations or peripheral edema  GI: NEGATIVE for nausea, abdominal pain, heartburn, or change in bowel habits   male: negative for dysuria, hematuria, decreased urinary stream, erectile dysfunction, urethral discharge  M: NEGATIVE for significant arthralgias or myalgia  N: NEGATIVE for weakness, dizziness or paresthesias  P: NEGATIVE for changes in mood or affect    OBJECTIVE:   /76 (BP Location: Right arm, Patient Position: Chair, Cuff Size: Adult Large)  Pulse 74  Temp 98.2  F (36.8  C) (Oral)  Resp 14  Wt 185 lb (83.9 kg)  BMI 23.12 kg/m2  EXAM:  GENERAL: healthy, alert and no distress  EYES: Eyes grossly normal to inspection, PERRL and conjunctivae and sclerae normal  HENT: ear canals and TM's normal, nose and mouth without ulcers or lesions  NECK: no adenopathy, no asymmetry, masses, or scars and thyroid normal to palpation  RESP: lungs clear to auscultation - no rales, rhonchi or wheezes  CV: regular rate and rhythm, normal S1 S2, no S3 or S4, no murmur, click or rub, no peripheral edema and peripheral pulses strong  ABDOMEN: tenderness epigastric, RLQ and LLQ without rebound or guarding, no organomegaly or  masses, liver span normal to percussion, bowel sounds normal and no palpable or pulsatile masses   (male): normal male genitalia without lesions or urethral discharge, no hernia  RECTAL: external hemorrhoid noted with multiple post hemorrhoid rugae   MS: no gross musculoskeletal defects noted, no edema  SKIN: no suspicious lesions or rashes  NEURO: Normal strength and tone, mentation intact and speech normal  PSYCH: mentation appears normal, affect normal/bright  LYMPH: normal ant/post cervical, supraclavicular nodes    Diagnostic:   Abdominal XR 2 views: moderate stool throughout.     ASSESSMENT/PLAN:   (Z00.00) Visit for preventive health examination  (primary encounter diagnosis)  Comment: Other than below, normal annual physical.   Plan: Lipid panel reflex to direct LDL, Comprehensive        metabolic panel (BMP + Alb, Alk Phos, ALT, AST,        Total. Bili, TP)            (R10.30) Lower abdominal pain  Comment: unclear of exact cause. Given history of constipation as well as stool noted on radiographs, this is a potential differential with related external hemorrhoids. However given headache and chills with short course and infectious etiology such as viral gastroenteritis is possible as well. Other differentials include but are not limited to IBD, malignancy, diverticulitis, appendicitis, or spontaneous bleeding diverticula. Given age, lack of fever, and lack of peritoneal signs, diverticulitis and appendicitis felt less likely. Does have father with colon cancer though was diagnosed at age 85. Given this with bleeding as well as history of recurrent hemorrhoids, will also have see colorectal surgery.   Plan: XR Abdomen 2 Views, CBC with platelets and         differential, Comprehensive metabolic panel         (BMP + Alb, Alk Phos, ALT, AST, Total. Bili,         TP), *UA reflex to Microscopic and Culture         ([facility] (except [place]        and [place]), COLORECTAL SURGERY  "REFERRAL            (K64.4) External hemorrhoids  Comment: as above.   Plan: COLORECTAL SURGERY REFERRAL            (K92.1) Hematochezia  Comment: as above   Plan: CBC with platelets and differential, COLORECTAL        SURGERY REFERRAL            (K21.9) Gastroesophageal reflux disease, esophagitis presence not specified  Comment: history of this. Current symptoms. Bleeding does not sound upper GI. Responded to omeprazole per report in the past. Will restart this for at least 4 weeks. May need chronically.   Plan: omeprazole (PRILOSEC) 20 MG CR capsule        -Medication use and side effects discussed with the patient. Patient is in complete understanding and agreement with plan.         COUNSELING:  Reviewed preventive health counseling, as reflected in patient instructions       Regular exercise       Healthy diet/nutrition     reports that he has never smoked. He has never used smokeless tobacco.      Estimated body mass index is 23.12 kg/(m^2) as calculated from the following:    Height as of 3/13/17: 6' 3\" (1.905 m).    Weight as of this encounter: 185 lb (83.9 kg).         Counseling Resources:  ATP IV Guidelines  Pooled Cohorts Equation Calculator  FRAX Risk Assessment  ICSI Preventive Guidelines  Dietary Guidelines for Americans, 2010  USDA's MyPlate  ASA Prophylaxis  Lung CA Screening    Lanre Lang PA-C  [facility]"

## 2017-08-02 ENCOUNTER — TRANSFERRED RECORDS (OUTPATIENT)
Dept: HEALTH INFORMATION MANAGEMENT | Facility: CLINIC | Age: 42
End: 2017-08-02

## 2017-11-04 ENCOUNTER — TELEPHONE (OUTPATIENT)
Dept: FAMILY MEDICINE | Facility: CLINIC | Age: 42
End: 2017-11-04

## 2017-11-04 DIAGNOSIS — Z20.1 CONTACT WITH AND (SUSPECTED) EXPOSURE TO TUBERCULOSIS: Primary | ICD-10-CM

## 2017-11-04 NOTE — TELEPHONE ENCOUNTER
Ph. [phone] able to leave detailed message    Needs order placed for a lab TB test.  Patient states he needs this as from [place] and will always come out positive thru skin    Nicole Costello

## 2017-11-06 NOTE — TELEPHONE ENCOUNTER
Pt reports his father passed away early September 2017. Cancer.  However, was diagnosed with TB the last few weeks of his life.  The entire family has been advised to be tested.   Note - there is message for spouse for same - her PCP Marcos in FM  They would like LO in FM late afternoon today.   Mateus, OK?   Hadley Doss RN

## 2017-11-06 NOTE — TELEPHONE ENCOUNTER
Mateus verbal ELLEN cook. Signed.   Left message on  lab order placed, call [phone] to schedule lab only in  this afternoon.  Hadley Doss RN

## 2017-11-06 NOTE — TELEPHONE ENCOUNTER
Please call patient for more info. Is this for work? Why is he requesting this?    -Mateus Lang, PAC

## 2017-11-10 DIAGNOSIS — Z20.1 CONTACT WITH AND (SUSPECTED) EXPOSURE TO TUBERCULOSIS: ICD-10-CM

## 2017-11-10 PROCEDURE — 86480 TB TEST CELL IMMUN MEASURE: CPT | Performed by: FAMILY MEDICINE

## 2017-11-10 PROCEDURE — 36415 COLL VENOUS BLD VENIPUNCTURE: CPT | Performed by: FAMILY MEDICINE

## 2017-11-10 NOTE — LETTER
November 13, 2017      Yordy Presley  [address] [facility] [address]        Dear ,    We are writing to inform you of your test results.    Your test results fall within the expected range(s) or remain unchanged from previous results.  Please continue with current treatment plan.    Resulted Orders   M Tuberculosis by Quantiferon   Result Value Ref Range    M Tuberculosis Result Negative NEG^Negative    M Tuberculosis Antigen Value 0.00 IU/mL      Comment:      This is a qualitative test.  The TB antigen IU/mL value is required for   documentation on certain government reporting forms but this value should not   be used to monitor disease progression or response to therapy.  Diagnosing or excluding tuberculosis disease, and assessing the probability of   LTBI, require a combination of epidemiological, historical, medical and   diagnostic findings that should be taken into account when interpreting   QuantiFERON TB results.         If you have any questions or concerns, please call the clinic at the number listed above.       Sincerely,      Mateus Lang,

## 2017-11-13 LAB
M TB TUBERC IFN-G BLD QL: NEGATIVE
M TB TUBERC IFN-G/MITOGEN IGNF BLD: 0 IU/ML

## 2018-06-30 ENCOUNTER — HOSPITAL ENCOUNTER (EMERGENCY)
Facility: CLINIC | Age: 43
Discharge: HOME | End: 2018-06-30
Attending: EMERGENCY MEDICINE | Admitting: EMERGENCY MEDICINE
Payer: COMMERCIAL

## 2018-06-30 ENCOUNTER — APPOINTMENT (OUTPATIENT)
Dept: MRI IMAGING | Facility: CLINIC | Age: 43
End: 2018-06-30
Attending: EMERGENCY MEDICINE
Payer: COMMERCIAL

## 2018-06-30 VITALS
RESPIRATION RATE: 20 BRPM | OXYGEN SATURATION: 98 % | DIASTOLIC BLOOD PRESSURE: 83 MMHG | TEMPERATURE: 98.4 F | SYSTOLIC BLOOD PRESSURE: 115 MMHG | HEART RATE: 78 BPM

## 2018-06-30 DIAGNOSIS — M54.41 ACUTE RIGHT-SIDED LOW BACK PAIN WITH RIGHT-SIDED SCIATICA: ICD-10-CM

## 2018-06-30 PROCEDURE — 25000132 ZZH RX MED GY IP 250 OP 250 PS 637: Performed by: EMERGENCY MEDICINE

## 2018-06-30 PROCEDURE — 20552 NJX 1/MLT TRIGGER POINT 1/2: CPT

## 2018-06-30 PROCEDURE — 25000128 H RX IP 250 OP 636: Performed by: EMERGENCY MEDICINE

## 2018-06-30 PROCEDURE — 99285 EMERGENCY DEPT VISIT HI MDM: CPT | Mod: 25

## 2018-06-30 PROCEDURE — 72158 MRI LUMBAR SPINE W/O & W/DYE: CPT

## 2018-06-30 PROCEDURE — 96375 TX/PRO/DX INJ NEW DRUG ADDON: CPT

## 2018-06-30 PROCEDURE — 96374 THER/PROPH/DIAG INJ IV PUSH: CPT | Mod: 59

## 2018-06-30 PROCEDURE — A9585 GADOBUTROL INJECTION: HCPCS | Performed by: EMERGENCY MEDICINE

## 2018-06-30 RX ORDER — GADOBUTROL 604.72 MG/ML
10 INJECTION INTRAVENOUS ONCE
Status: COMPLETED | OUTPATIENT
Start: 2018-06-30 | End: 2018-06-30

## 2018-06-30 RX ORDER — HYDROCODONE BITARTRATE AND ACETAMINOPHEN 5; 325 MG/1; MG/1
1 TABLET ORAL ONCE
Status: COMPLETED | OUTPATIENT
Start: 2018-06-30 | End: 2018-06-30

## 2018-06-30 RX ORDER — HYDROMORPHONE HYDROCHLORIDE 1 MG/ML
0.5 INJECTION, SOLUTION INTRAMUSCULAR; INTRAVENOUS; SUBCUTANEOUS ONCE
Status: COMPLETED | OUTPATIENT
Start: 2018-06-30 | End: 2018-06-30

## 2018-06-30 RX ORDER — METHYLPREDNISOLONE 4 MG
TABLET, DOSE PACK ORAL
Qty: 21 TABLET | Refills: 0 | Status: SHIPPED | OUTPATIENT
Start: 2018-06-30 | End: 2021-06-22

## 2018-06-30 RX ORDER — LIDOCAINE 50 MG/G
1 PATCH TOPICAL EVERY 24 HOURS
Qty: 10 PATCH | Refills: 0 | Status: SHIPPED | OUTPATIENT
Start: 2018-06-30 | End: 2018-07-10

## 2018-06-30 RX ORDER — BUPIVACAINE HYDROCHLORIDE 5 MG/ML
INJECTION, SOLUTION PERINEURAL
Status: DISCONTINUED
Start: 2018-06-30 | End: 2018-06-30 | Stop reason: HOSPADM

## 2018-06-30 RX ORDER — KETOROLAC TROMETHAMINE 15 MG/ML
15 INJECTION, SOLUTION INTRAMUSCULAR; INTRAVENOUS ONCE
Status: COMPLETED | OUTPATIENT
Start: 2018-06-30 | End: 2018-06-30

## 2018-06-30 RX ADMIN — GADOBUTROL 10 ML: 604.72 INJECTION INTRAVENOUS at 13:36

## 2018-06-30 RX ADMIN — Medication 0.5 MG: at 10:53

## 2018-06-30 RX ADMIN — KETOROLAC TROMETHAMINE 15 MG: 15 INJECTION, SOLUTION INTRAMUSCULAR; INTRAVENOUS at 12:03

## 2018-06-30 RX ADMIN — Medication 0.5 MG: at 10:26

## 2018-06-30 RX ADMIN — HYDROCODONE BITARTRATE AND ACETAMINOPHEN 1 TABLET: 5; 325 TABLET ORAL at 12:03

## 2018-06-30 ASSESSMENT — ENCOUNTER SYMPTOMS
ABDOMINAL PAIN: 0
BACK PAIN: 1
VOMITING: 0
ROS GI COMMENTS: NO BOWEL INCONTINENCE
HEMATURIA: 0
FEVER: 0
NAUSEA: 0
CHILLS: 0
WEAKNESS: 1
DIFFICULTY URINATING: 0
NUMBNESS: 1

## 2018-06-30 NOTE — ED AVS SNAPSHOT
[facility]    [address]    [facility] [address]    Phone:  [phone]    Fax:  [phone]                                       Yordy Presley   MRN: [id number]    Department:  [facility]   Date of Visit:  6/30/2018           After Visit Summary Signature Page     I have received my discharge instructions, and my questions have been answered. I have discussed any challenges I see with this plan with the nurse or doctor.    ..........................................................................................................................................  Patient/Patient Representative Signature      ..........................................................................................................................................  Patient Representative Print Name and Relationship to Patient    ..................................................               ................................................  Date                                            Time    ..........................................................................................................................................  Reviewed by Signature/Title    ...................................................              ..............................................  Date                                                            Time

## 2018-06-30 NOTE — ED TRIAGE NOTES
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 Pt states he had a lumbar fusion 6 years ago. Numbness radiating into R leg. ABCs intact, ambulatory in triage.

## 2018-06-30 NOTE — ED AVS SNAPSHOT
[facility] Emergency Department    [address] [place] [address]    Phone:  [phone]    Fax:  [phone]                                       Yordy Presley   MRN: [id number]    Department:  [facility] Emergency Department   Date of Visit:  6/30/2018           Patient Information     Date Of Birth          1975        Your diagnoses for this visit were:     Acute right-sided low back pain with right-sided sciatica        You were seen by Safia Vazquez MD.      Follow-up Information     Follow up with [facility] Emergency Department.    Specialty:  EMERGENCY MEDICINE    Why:  immediately , If symptoms worsen    Contact information:    [address] [place] [address] [phone]        Follow up with [facility], [place] In 3 days.    Specialty:  Family Practice    Contact information:    [address] [place] [address]  [phone]          Discharge Instructions         Discharge Instructions  Back Pain  You were seen today for back pain. Back pain can have many causes, but most will get better without surgery or other specific treatment. Sometimes there is a herniated ( slipped ) disc. We don t usually do MRI scans to look for these right away, since most herniated discs will get better on their own with time.  Today, we did not find any evidence that your back pain was caused by a serious condition, such as an infection, fracture, or tumor. However, sometimes symptoms develop over time and cannot be found during an emergency visit, so it is very important that you follow up with your primary doctor.  Return to the Emergency Department if:    You develop a fever with your back pain.     You have weakness or change in sensation in one or both legs.    You lose control of your bowels or bladder, or can t empty your bladder.    Your pain gets much worse.     Follow-up with your doctor:    Unless your pain  has completely gone away, please make an appointment with your doctor within one week.  You may need further management of your back pain, such as more pain medication, imaging such as an X-ray or MRI, or physical therapy.    What can I do to help myself?    Remain Active -- People are often afraid that they will hurt their back further or delay recovery by remaining active, but this is one of the best things you can do for your back. In fact, prolonged bed rest is not recommended. Studies have shown that people with low back pain recover faster when they remain active. Movement helps to bring blood flow to the muscles and relieve muscle spasms as well as preventing loss of muscle strength.    Heat -- Using a heating pad can help with low back pain during the first few weeks. Do not sleep with a heating pad, as you can be burned.     Pain medications - You may take a pain medication such as Tylenol  (acetaminophen), Advil , Nuprin  (ibuprofen) or Aleve  (naproxen).  If you have been given a narcotic such as Vicodin  (hydrocodone with acetaminophen), Percocet  (oxycodone with acetaminophen), codeine, or a muscle relaxant such as Flexeril  (cyclobenzaprine) or Soma  (carisoprodol), do not drive for four hours after you have taken it. If the narcotic contains Tylenol  (acetaminophen), do not take Tylenol  with it. All narcotics will cause constipation, so eat a high fiber diet.   If you were given a prescription for medicine here today, be sure to read all of the information (including the package insert) that comes with your prescription.  This will include important information about the medicine, its side effects, and any warnings that you need to know about.  The pharmacist who fills the prescription can provide more information and answer questions you may have about the medicine.  If you have questions or concerns that the pharmacist cannot address, please call or return to the Emergency Department.   Opioid  Medication Information    Pain medications are among the most commonly prescribed medicines, so we are including this information for all our patients. If you did not receive pain medication or get a prescription for pain medicine, you can ignore it.     You may have been given a prescription for an opioid (narcotic) pain medicine and/or have received a pain medicine while here in the Emergency Department. These medicines can make you drowsy or impaired. You must not drive, operate dangerous equipment, or engage in any other dangerous activities while taking these medications. If you drive while taking these medications, you could be arrested for DUI, or driving under the influence. Do not drink any alcohol while you are taking these medications.     Opioid pain medications can cause addiction. If you have a history of chemical dependency of any type, you are at a higher risk of becoming addicted to pain medications.  Only take these prescribed medications to treat your pain when all other options have been tried. Take it for as short a time and as few doses as possible. Store your pain pills in a secure place, as they are frequently stolen and provide a dangerous opportunity for children or visitors in your house to start abusing these powerful medications. We will not replace any lost or stolen medicine.  As soon as your pain is better, you should flush all your remaining medication.     Many prescription pain medications contain Tylenol  (acetaminophen), including Vicodin , Tylenol #3 , Norco , Lortab , and Percocet .  You should not take any extra pills of Tylenol  if you are using these prescription medications or you can get very sick.  Do not ever take more than 3000 mg of acetaminophen in any 24 hour period.    All opioids tend to cause constipation. Drink plenty of water and eat foods that have a lot of fiber, such as fruits, vegetables, prune juice, apple juice and high fiber cereal.  Take a laxative if  you don t move your bowels at least every other day. Miralax , Milk of Magnesia, Colace , or Senna  can be used to keep you regular.      Remember that you can always come back to the Emergency Department if you are not able to see your regular doctor in the amount of time listed above, if you get any new symptoms, or if there is anything that worries you.        24 Hour Appointment Hotline       To make an appointment at any [facility], call [phone] ([phone]). If you don't have a family doctor or clinic, we will help you find one. [facility] clinics are conveniently located to serve the needs of you and your family.             Review of your medicines      START taking        Dose / Directions Last dose taken    lidocaine 5 % Patch   Commonly known as:  LIDODERM   Dose:  1 patch   Quantity:  10 patch        Place 1 patch onto the skin every 24 hours for 10 days   Refills:  0        methylPREDNISolone 4 MG tablet   Commonly known as:  MEDROL DOSEPAK   Quantity:  21 tablet        Follow package instructions   Refills:  0          Our records show that you are taking the medicines listed below. If these are incorrect, please call your family doctor or clinic.        Dose / Directions Last dose taken    bisacodyl 5 MG EC tablet   Commonly known as:  DULCOLAX   Dose:  5 mg        Take 5 mg by mouth daily as needed   Refills:  0        HYDROcodone-acetaminophen  MG per tablet   Commonly known as:  NORCO   Dose:  1 tablet        Take 1 tablet by mouth every 6 hours as needed   Refills:  0        hydrocortisone 2.5 % cream   Commonly known as:  ANUSOL-HC   Quantity:  30 g        Place rectally 2 times daily   Refills:  0        magnesium citrate 1.745 GM/30ML solution   Dose:  296 mL   Quantity:  1 Bottle        Take 296 mLs by mouth once as needed Use as needed for constipation despite miralax and senna-lamberto   Refills:  0        omeprazole 20 MG CR capsule   Commonly known as:  priLOSEC   Dose:  20  mg   Quantity:  30 capsule        Take 1 capsule (20 mg) by mouth daily   Refills:  1        senna 8.6 MG tablet   Commonly known as:  SENOKOT   Dose:  2 tablet   Quantity:  120 tablet        Take 2 tablets by mouth 2 times daily   Refills:  0        TYLENOL PO        Refills:  0                Prescriptions were sent or printed at these locations (2 Prescriptions)                   Other Prescriptions                Printed at Department/Unit printer (2 of 2)         lidocaine (LIDODERM) 5 % Patch               methylPREDNISolone (MEDROL DOSEPAK) 4 MG tablet                Procedures and tests performed during your visit     Lumbar spine MRI w & w/o contrast - surgery <10yrs      Orders Needing Specimen Collection     None      Pending Results     Date and Time Order Name Status Description    6/30/2018 1203 Lumbar spine MRI w & w/o contrast - surgery <10yrs In process             Pending Culture Results     No orders found from 6/28/2018 to 7/1/2018.            Pending Results Instructions     If you had any lab results that were not finalized at the time of your Discharge, you can call the ED Lab Result RN at [phone]. You will be contacted by this team for any positive Lab results or changes in treatment. The nurses are available 7 days a week from 10A to 6:30P.  You can leave a message 24 hours per day and they will return your call.        Test Results From Your Hospital Stay        6/30/2018 12:48 PM      Result not yet available     Exam Ended                Clinical Quality Measure: Blood Pressure Screening     Your blood pressure was checked while you were in the emergency department today. The last reading we obtained was  BP: 111/72 . Please read the guidelines below about what these numbers mean and what you should do about them.  If your systolic blood pressure (the top number) is less than 120 and your diastolic blood pressure (the bottom number) is less than 80, then your blood pressure is  "normal. There is nothing more that you need to do about it.  If your systolic blood pressure (the top number) is 120-139 or your diastolic blood pressure (the bottom number) is 80-89, your blood pressure may be higher than it should be. You should have your blood pressure rechecked within a year by a primary care provider.  If your systolic blood pressure (the top number) is 140 or greater or your diastolic blood pressure (the bottom number) is 90 or greater, you may have high blood pressure. High blood pressure is treatable, but if left untreated over time it can put you at risk for heart attack, stroke, or kidney failure. You should have your blood pressure rechecked by a primary care provider within the next 4 weeks.  If your provider in the emergency department today gave you specific instructions to follow-up with your doctor or provider even sooner than that, you should follow that instruction and not wait for up to 4 weeks for your follow-up visit.        Thank you for choosing [facility]       Thank you for choosing [facility] for your care. Our goal is always to provide you with excellent care. Hearing back from our patients is one way we can continue to improve our services. Please take a few minutes to complete the written survey that you may receive in the mail after you visit with us. Thank you!        [employer] Information     Sequent lets you send messages to your doctor, view your test results, renew your prescriptions, schedule appointments and more. To sign up, go to [email or web address] . Click on \"Log in\" on the left side of the screen, which will take you to the Welcome page. Then click on \"Sign up Now\" on the right side of the page.     You will be asked to enter the access code listed below, as well as some personal information. Please follow the directions to create your username and password.     Your access code is: [email or web address]  Expires: 2018  2:29 PM     Your access code will  in " 90 days. If you need help or a new code, please call your [facility] clinic or [phone].        Care EveryWhere ID     This is your Care EveryWhere ID. This could be used by other organizations to access your [facility] medical records  [id number]        Equal Access to Services     CED MALCOLM : Wilbert Fitzpatrick, waaxda luqadaha, qaybta kaalmabrijesh thomas, mari thayer. So [facility] [phone].    ATENCIÓN: Si habla español, tiene a olivares disposición servicios gratuitos de asistencia lingüística. Llame al [phone].    We comply with applicable federal civil rights laws and [place] laws. We do not discriminate on the basis of race, color, national origin, age, disability, sex, sexual orientation, or gender identity.            After Visit Summary       This is your record. Keep this with you and show to your community pharmacist(s) and doctor(s) at your next visit.

## 2018-06-30 NOTE — ED PROVIDER NOTES
History     Chief Complaint:  Back Pain    HPI   Yordy Presley is a 42 year old male who presents for evaluation of back pain with radicular symptoms. 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 Pain was mild initially and has been gradually worsening over the last 12 hours. He reports pain starting in his right lower back and radiating down his right lower extremity to his ankle. He reports associated tingling and numbness on his right lateral leg and states his leg feels weak secondary to severe pain. He has been using Icy Hot, a back brace, Ibuprofen, and Tylenol at home but is still having severe pain, prompting his visit to the ED. The patient denies any saddle anesthesia, urinary or bowel incontinence, focal weakness, fevers, chills, or abdominal pain. He denies any trauma or falls. The patient has a history of lumbar fusion about 6 years ago and reports he had a subsequent surgery due to hardware migration to remove screws causing radicular symptoms on the left.  The surgery did not completely relieve the symptoms.    Allergies:  No Known Allergies     Medications:    The patient is not currently taking any prescribed medications.    Past Medical History:    The patient does not have any past pertinent medical history.    Past Surgical History:    Back surgery x2, L5 fusion and subsequent surgery for removal of screws per patient report.   Tonsillectomy  Wrist surgery    Family History:    Lymphoma  Colon cancer    Social History:  Smoking status: Never smoker  Alcohol use: No  Presents to the ED with his wife and son  Marital Status:   [2]     Review of Systems   Constitutional: Negative for chills and fever.   Gastrointestinal: Negative for abdominal pain, nausea and vomiting.        No bowel incontinence   Genitourinary: Negative for difficulty urinating, enuresis and hematuria.   Musculoskeletal: Positive for back pain.   Neurological: Positive for  weakness (subjective) and numbness.        RLE tingling    All other systems reviewed and are negative.      Physical Exam   Patient Vitals for the past 24 hrs:   BP Temp Temp src Pulse Heart Rate Resp SpO2   06/30/18 1335 111/72 - - - - - 96 %   06/30/18 1236 114/88 - - 74 - 16 98 %   06/30/18 1144 - - - - - - 96 %   06/30/18 1044 122/86 - - 86 - 18 98 %   06/30/18 0925 (!) 115/91 98.8  F (37.1  C) Temporal - 75 16 100 %     Physical Exam   Gen: Pleasant, appears stated age.    Eye:   Pupils are equal, round, and reactive.     Sclera non-injected.    ENT:   Moist mucus membranes.     Normal tongue.    Oropharynx without lesions.    Cardiac:     Normal rate and regular rhythm.    No murmurs, gallops, or rubs.    Pulmonary:     Clear to auscultation bilaterally.    No wheezes, rales, or rhonchi.    Abdomen:     Normal active bowel sounds.     Abdomen is soft and non-distended, without focal tenderness.    Musculoskeletal:     Positive leg raise bilaterally, 20 degrees on right, 30 degrees on left.   No midline thoracic or lumbar spine tenderness.     TTP to the right of L5, lateral to healed surgical incision.  Muscles tight.    Extremities:    No edema.    Skin:   Warm and dry.    Neurologic:    Non-focal exam without asymmetric weakness or numbness.    Normal tone  4/5 strength in hip flexors, knee extensors, dorsiflexion, plantarflexion, EHL, FHL on left secondary to pain.  5/5 strength in hip flexors, knee extensors, dorsiflexion, plantarflexion, EHL, FHL on right.  Fine touch sensation intact throughout bilateral lower extremities.  1+ reflexes at patella tendon bilaterally.      Psychiatric:     Normal affect with appropriate interaction with examiner.    Emergency Department Course   Imaging:    MR Lumbar Spine w and w/o contrast  Negative per verbal report from radiology, Dr. Angulo.   Lumbar spine MRI w & w/o contrast - surgery <10yrs   Final Result   IMPRESSION:   1. Prior L4-S1 fusion procedure.   2. L2-L3  degenerative disc and facet disease without stenosis.   3. L3-L4 facet arthropathy without stenosis.      FLAQUITO ESPOSITO MD          Interventions:  1026: Dilaudid 0.5 mg IV  1053: Dilaudid 0.5 mg IV  1203: Toradol 15 mg IV  1203: Norco 1 tablet oral    Procedures:  Procedure Note:  Trigger point injection  Indication - muscle spasm and pain  Area cleaned with alcohol swab.  3cc of 1% lidocaine injected in paraspinous muscle to the right of L5.   Pt tolerated well.  Band-aid applied.    Emergency Department Course:  Past medical records, nursing notes, and vitals reviewed.  1002: I performed an exam of the patient and obtained history, as documented above.  IV placed, medications given as above.     1158: I rechecked the patient. He is only feeling slightly improved after 2 doses of Dilaudid. Discussed obtaining MRI in the ED and he would like to proceed with MRI.     The patient was sent for an L spine MRI while in the emergency department, findings above.    1426: I spoke to Dr. Esposito of radiology. MRI is unremarkable.     1432: I rechecked the patient. Findings and plan explained to the Patient. Patient discharged home with instructions regarding supportive care, medications, and reasons to return. The importance of close follow-up was reviewed.     Impression & Plan      Medical Decision Making:  This is a 42 year old male with a history of lumbar spine fusion 6 years who presents today with increased pain in the right back radiating down the right leg. The patient has some subjective weakness from pain in the right lower extremity. He is concerned that there may have been some hardware migration, which has already occurred with his left side and required revision. He has responded partially to medical management in the ED, although still is having fairly significant pain.  He is ambulatory.  Given radicular symptoms, he was started on medrol dose pack.  MRI results are as above, and do not demonstrate a clear  etiology for symptoms. I have recommended follow up with his back surgeon for additional evaluation. He will return to the ED immediately if he develops any increased numbness, weakness, groin numbness, fevers or chills, or for any other concerns.     Diagnosis:    ICD-10-CM   1. Acute right-sided low back pain with right-sided sciatica M54.41     Disposition: Discharged to home    Discharge Medications:  New Prescriptions    LIDOCAINE (LIDODERM) 5 % PATCH    Place 1 patch onto the skin every 24 hours for 10 days    METHYLPREDNISOLONE (MEDROL DOSEPAK) 4 MG TABLET    Follow package instructions     Giovanna Lin  6/30/2018   [facility] EMERGENCY DEPARTMENT    IGiovanna, am serving as a scribe at 10:02 AM on 6/30/2018 to document services personally performed by Safia Vazquez MD based on my observations and the provider's statements to me.        Safia Vazquez MD  07/01/18 0625

## 2020-02-16 ENCOUNTER — HEALTH MAINTENANCE LETTER (OUTPATIENT)
Age: 45
End: 2020-02-16

## 2020-11-16 NOTE — DISCHARGE INSTRUCTIONS
Discharge Instructions  Back Pain  You were seen today for back pain. Back pain can have many causes, but most will get better without surgery or other specific treatment. Sometimes there is a herniated ( slipped ) disc. We don t usually do MRI scans to look for these right away, since most herniated discs will get better on their own with time.  Today, we did not find any evidence that your back pain was caused by a serious condition, such as an infection, fracture, or tumor. However, sometimes symptoms develop over time and cannot be found during an emergency visit, so it is very important that you follow up with your primary doctor.  Return to the Emergency Department if:    You develop a fever with your back pain.     You have weakness or change in sensation in one or both legs.    You lose control of your bowels or bladder, or can t empty your bladder.    Your pain gets much worse.     Follow-up with your doctor:    Unless your pain has completely gone away, please make an appointment with your doctor within one week.  You may need further management of your back pain, such as more pain medication, imaging such as an X-ray or MRI, or physical therapy.    What can I do to help myself?    Remain Active -- People are often afraid that they will hurt their back further or delay recovery by remaining active, but this is one of the best things you can do for your back. In fact, prolonged bed rest is not recommended. Studies have shown that people with low back pain recover faster when they remain active. Movement helps to bring blood flow to the muscles and relieve muscle spasms as well as preventing loss of muscle strength.    Heat -- Using a heating pad can help with low back pain during the first few weeks. Do not sleep with a heating pad, as you can be burned.     Pain medications - You may take a pain medication such as Tylenol  (acetaminophen), Advil , Nuprin  (ibuprofen) or Aleve  (naproxen).  If you have  MESSAGE GIVEN TO MR. RODRIGUEZ.   been given a narcotic such as Vicodin  (hydrocodone with acetaminophen), Percocet  (oxycodone with acetaminophen), codeine, or a muscle relaxant such as Flexeril  (cyclobenzaprine) or Soma  (carisoprodol), do not drive for four hours after you have taken it. If the narcotic contains Tylenol  (acetaminophen), do not take Tylenol  with it. All narcotics will cause constipation, so eat a high fiber diet.   If you were given a prescription for medicine here today, be sure to read all of the information (including the package insert) that comes with your prescription.  This will include important information about the medicine, its side effects, and any warnings that you need to know about.  The pharmacist who fills the prescription can provide more information and answer questions you may have about the medicine.  If you have questions or concerns that the pharmacist cannot address, please call or return to the Emergency Department.   Opioid Medication Information    Pain medications are among the most commonly prescribed medicines, so we are including this information for all our patients. If you did not receive pain medication or get a prescription for pain medicine, you can ignore it.     You may have been given a prescription for an opioid (narcotic) pain medicine and/or have received a pain medicine while here in the Emergency Department. These medicines can make you drowsy or impaired. You must not drive, operate dangerous equipment, or engage in any other dangerous activities while taking these medications. If you drive while taking these medications, you could be arrested for DUI, or driving under the influence. Do not drink any alcohol while you are taking these medications.     Opioid pain medications can cause addiction. If you have a history of chemical dependency of any type, you are at a higher risk of becoming addicted to pain medications.  Only take these prescribed medications to treat your pain when  all other options have been tried. Take it for as short a time and as few doses as possible. Store your pain pills in a secure place, as they are frequently stolen and provide a dangerous opportunity for children or visitors in your house to start abusing these powerful medications. We will not replace any lost or stolen medicine.  As soon as your pain is better, you should flush all your remaining medication.     Many prescription pain medications contain Tylenol  (acetaminophen), including Vicodin , Tylenol #3 , Norco , Lortab , and Percocet .  You should not take any extra pills of Tylenol  if you are using these prescription medications or you can get very sick.  Do not ever take more than 3000 mg of acetaminophen in any 24 hour period.    All opioids tend to cause constipation. Drink plenty of water and eat foods that have a lot of fiber, such as fruits, vegetables, prune juice, apple juice and high fiber cereal.  Take a laxative if you don t move your bowels at least every other day. Miralax , Milk of Magnesia, Colace , or Senna  can be used to keep you regular.      Remember that you can always come back to the Emergency Department if you are not able to see your regular doctor in the amount of time listed above, if you get any new symptoms, or if there is anything that worries you.

## 2020-11-29 ENCOUNTER — HEALTH MAINTENANCE LETTER (OUTPATIENT)
Age: 45
End: 2020-11-29

## 2021-04-10 ENCOUNTER — HEALTH MAINTENANCE LETTER (OUTPATIENT)
Age: 46
End: 2021-04-10

## 2021-04-18 ENCOUNTER — HOSPITAL ENCOUNTER (EMERGENCY)
Facility: CLINIC | Age: 46
Discharge: HOME | End: 2021-04-18
Attending: PHYSICIAN ASSISTANT | Admitting: PHYSICIAN ASSISTANT
Payer: COMMERCIAL

## 2021-04-18 ENCOUNTER — APPOINTMENT (OUTPATIENT)
Dept: CT IMAGING | Facility: CLINIC | Age: 46
End: 2021-04-18
Attending: PHYSICIAN ASSISTANT
Payer: COMMERCIAL

## 2021-04-18 VITALS
OXYGEN SATURATION: 100 % | DIASTOLIC BLOOD PRESSURE: 82 MMHG | TEMPERATURE: 98.3 F | HEART RATE: 75 BPM | RESPIRATION RATE: 18 BRPM | SYSTOLIC BLOOD PRESSURE: 126 MMHG

## 2021-04-18 DIAGNOSIS — R10.84 ABDOMINAL PAIN, GENERALIZED: ICD-10-CM

## 2021-04-18 DIAGNOSIS — K92.1 HEMATOCHEZIA: ICD-10-CM

## 2021-04-18 DIAGNOSIS — K64.9 HEMORRHOIDS, UNSPECIFIED HEMORRHOID TYPE: ICD-10-CM

## 2021-04-18 LAB
ABO + RH BLD: NORMAL
ABO + RH BLD: NORMAL
ALBUMIN SERPL-MCNC: 3.9 G/DL (ref 3.4–5)
ALP SERPL-CCNC: 80 U/L (ref 40–150)
ALT SERPL W P-5'-P-CCNC: 36 U/L (ref 0–70)
ANION GAP SERPL CALCULATED.3IONS-SCNC: 2 MMOL/L (ref 3–14)
AST SERPL W P-5'-P-CCNC: 22 U/L (ref 0–45)
BASOPHILS # BLD AUTO: 0 10E9/L (ref 0–0.2)
BASOPHILS NFR BLD AUTO: 0.3 %
BILIRUB SERPL-MCNC: 0.5 MG/DL (ref 0.2–1.3)
BLD GP AB SCN SERPL QL: NORMAL
BLOOD BANK CMNT PATIENT-IMP: NORMAL
BUN SERPL-MCNC: 13 MG/DL (ref 7–30)
CALCIUM SERPL-MCNC: 8.6 MG/DL (ref 8.5–10.1)
CHLORIDE SERPL-SCNC: 107 MMOL/L (ref 94–109)
CO2 SERPL-SCNC: 29 MMOL/L (ref 20–32)
CREAT SERPL-MCNC: 0.86 MG/DL (ref 0.66–1.25)
DIFFERENTIAL METHOD BLD: NORMAL
EOSINOPHIL # BLD AUTO: 0 10E9/L (ref 0–0.7)
EOSINOPHIL NFR BLD AUTO: 0.5 %
ERYTHROCYTE [DISTWIDTH] IN BLOOD BY AUTOMATED COUNT: 13.3 % (ref 10–15)
GFR SERPL CREATININE-BSD FRML MDRD: >90 ML/MIN/{1.73_M2}
GLUCOSE SERPL-MCNC: 99 MG/DL (ref 70–99)
HCT VFR BLD AUTO: 44.5 % (ref 40–53)
HGB BLD-MCNC: 15 G/DL (ref 13.3–17.7)
IMM GRANULOCYTES # BLD: 0 10E9/L (ref 0–0.4)
IMM GRANULOCYTES NFR BLD: 0.3 %
LACTATE BLD-SCNC: 0.8 MMOL/L (ref 0.7–2)
LYMPHOCYTES # BLD AUTO: 1.6 10E9/L (ref 0.8–5.3)
LYMPHOCYTES NFR BLD AUTO: 24.7 %
MCH RBC QN AUTO: 32.5 PG (ref 26.5–33)
MCHC RBC AUTO-ENTMCNC: 33.7 G/DL (ref 31.5–36.5)
MCV RBC AUTO: 97 FL (ref 78–100)
MONOCYTES # BLD AUTO: 0.4 10E9/L (ref 0–1.3)
MONOCYTES NFR BLD AUTO: 6.3 %
NEUTROPHILS # BLD AUTO: 4.3 10E9/L (ref 1.6–8.3)
NEUTROPHILS NFR BLD AUTO: 67.9 %
NRBC # BLD AUTO: 0 10*3/UL
NRBC BLD AUTO-RTO: 0 /100
PLATELET # BLD AUTO: 233 10E9/L (ref 150–450)
POTASSIUM SERPL-SCNC: 4 MMOL/L (ref 3.4–5.3)
PROT SERPL-MCNC: 7.5 G/DL (ref 6.8–8.8)
RBC # BLD AUTO: 4.61 10E12/L (ref 4.4–5.9)
SODIUM SERPL-SCNC: 138 MMOL/L (ref 133–144)
SPECIMEN EXP DATE BLD: NORMAL
WBC # BLD AUTO: 6.4 10E9/L (ref 4–11)

## 2021-04-18 PROCEDURE — 86850 RBC ANTIBODY SCREEN: CPT | Performed by: PHYSICIAN ASSISTANT

## 2021-04-18 PROCEDURE — 86901 BLOOD TYPING SEROLOGIC RH(D): CPT | Performed by: PHYSICIAN ASSISTANT

## 2021-04-18 PROCEDURE — 80053 COMPREHEN METABOLIC PANEL: CPT | Performed by: PHYSICIAN ASSISTANT

## 2021-04-18 PROCEDURE — 258N000003 HC RX IP 258 OP 636: Performed by: PHYSICIAN ASSISTANT

## 2021-04-18 PROCEDURE — 250N000011 HC RX IP 250 OP 636: Performed by: PHYSICIAN ASSISTANT

## 2021-04-18 PROCEDURE — 96361 HYDRATE IV INFUSION ADD-ON: CPT

## 2021-04-18 PROCEDURE — 85025 COMPLETE CBC W/AUTO DIFF WBC: CPT | Performed by: PHYSICIAN ASSISTANT

## 2021-04-18 PROCEDURE — 74177 CT ABD & PELVIS W/CONTRAST: CPT

## 2021-04-18 PROCEDURE — 96360 HYDRATION IV INFUSION INIT: CPT | Mod: 59

## 2021-04-18 PROCEDURE — 83605 ASSAY OF LACTIC ACID: CPT | Performed by: PHYSICIAN ASSISTANT

## 2021-04-18 PROCEDURE — 86900 BLOOD TYPING SEROLOGIC ABO: CPT | Performed by: PHYSICIAN ASSISTANT

## 2021-04-18 PROCEDURE — 99285 EMERGENCY DEPT VISIT HI MDM: CPT | Mod: 25

## 2021-04-18 RX ORDER — LIDOCAINE HYDROCHLORIDE AND HYDROCORTISONE ACETATE 30; 5 MG/G; MG/G
1 CREAM RECTAL 2 TIMES DAILY
Qty: 7 G | Refills: 0 | Status: SHIPPED | OUTPATIENT
Start: 2021-04-18 | End: 2021-04-28

## 2021-04-18 RX ORDER — IOPAMIDOL 755 MG/ML
93 INJECTION, SOLUTION INTRAVASCULAR ONCE
Status: COMPLETED | OUTPATIENT
Start: 2021-04-18 | End: 2021-04-18

## 2021-04-18 RX ADMIN — IOPAMIDOL 93 ML: 755 INJECTION, SOLUTION INTRAVENOUS at 11:54

## 2021-04-18 RX ADMIN — SODIUM CHLORIDE 1000 ML: 9 INJECTION, SOLUTION INTRAVENOUS at 11:38

## 2021-04-18 ASSESSMENT — ENCOUNTER SYMPTOMS
VOMITING: 0
CHILLS: 0
CONSTIPATION: 0
NAUSEA: 1
ANAL BLEEDING: 1
BLOOD IN STOOL: 1
FEVER: 0
ABDOMINAL PAIN: 1
DIARRHEA: 0

## 2021-04-18 NOTE — ED PROVIDER NOTES
History   Chief Complaint:  Rectal Bleeding and Abdominal Pain    The history is provided by the patient.     Yordy Presley is a 45 year old male who presents for evaluation of centralized abdominal pain and rectal bleeding. He reports these symptoms have been present for just over a week. He does report a history of mild rectal bleeding for years associated with his known history of hemorrhoids, however he states this bleeding is a larger amount than the typical hemorrhoidal bleeds and his stools have been dark, almost black, in color. Furthermore, he has never had abdominal pain associated with his hemorrhoids in the past. Given this dissimilarity and the persistence of symptoms, he decided to present to the ED for evaluation. Here, he does report the centralized abdominal pain, as well as nausea. He denies vomiting or hematemesis. He also states his stools have been looser than normal, but not diarrheal. He denies recent fever or chills. He did have a colonoscopy about 1 year ago, stating it was overall normal other than some old ulcers. He is not anticoagulated, and he denies recent antibiotic use, foreign travel, or hospitalization.  He has been taking stool softeners recently and using cream for hemorrhoids.    Allergies:  Oxycodone   Penicillins     Medications:    Glycolax  Senna   Dulcolax  Prilosec     Past Medical History:    Hemorrhoids   GERD  Drug induced constipation   Lumbar spine degenerative joint disease   Chronic airway obstruction     Past Surgical History:    Lumbar laminectomy   Tonsillectomy   Right wrist surgery      Family History:    Father: Lymphoma, colon cancer     Social History:  Presents unaccompanied to the ED   Denies tobacco use.   Denies heavy alcohol use.    Review of Systems   Constitutional: Negative for chills and fever.   Gastrointestinal: Positive for abdominal pain, anal bleeding, blood in stool and nausea. Negative for constipation, diarrhea and vomiting.   All other  systems reviewed and are negative.    Physical Exam     Patient Vitals for the past 24 hrs:   BP Temp Pulse Resp SpO2   04/18/21 1019 (!) 122/101 98.3  F (36.8  C) 76 18 99 %        Physical Exam  General: Awake, alert, pleasant, non-toxic.  Head:  Scalp is NC/AT  Eyes:  Conjunctiva normal, PERRL  ENT:  The external nose and ears are normal.   Neck:  Normal range of motion without rigidity.  CV:  Regular rate and rhythm    No pathologic murmur, rubs, or gallops.  Resp:  Breath sounds are clear bilaterally.  No crackles, wheezes, rhonchi, stridor.    Non-labored, no retractions or accessory muscle use  Abdomen: Abdomen is soft, no distension, Mild left sided tenderness, no masses. No CVA tenderness.  Few non-thrombosed external hemorrhoids.  No BRBPR.  (Performed in presence of female chaparone RN)  MS:  No lower extremity edema or asymmetric calf swelling. Normal ROM in all joints without effusions.    No midline cervical, thoracic, or lumbar tenderness  Skin:  Warm and dry, No rash or lesions noted. 2+ peripheral pulses in all extremities  Neuro:  Alert and oriented x3.  No gross motor deficits.  No facial asymmetry.  Psych: Awake. Alert. Normal affect. Appropriate interactions.    Emergency Department Course     Imaging:  CT Abdomen Pelvis w Contrast:  No acute abnormality in the abdomen or pelvis. No cause for left lower quadrant pain is identified.  Reading per radiology.      Laboratory:  CBC: WBC 6.4, HGB: 15.0, PLT: 233  CMP: Anion gap: 2 (L), o/w WNL (Creatinine: 0.86)    Lactic acid (Resulted at 1150): 0.8    Blood type & screen: O+; antibodies negative    Emergency Department Course:    Reviewed:  I reviewed the patient's nursing notes, vitals, past medical records, and Care Everywhere.     Assessments:  1114: I performed an exam of the patient, as documented above. History obtained and plan for ED work up discussed as well.   1241: I reassessed the patient and discussed the results of the work up. I also  performed a rectal exam (chaperoned by LIDYA Rivera). Discussed my findings and recommendations for home treatment.     Interventions:  1138: NS 1L IV bolus    Disposition:  The patient was discharged to home.     Impression & Plan      Medical Decision Making:   Yordy Presley is a 45 year old male who presents with abdominal discomfort, hematochezia.  Broad differential considered.  On examination he is well-appearing and vitally stable.  Work-up here is reassuring with normal hemoglobin, platelets and no evidence to suggest more severe blood loss.  CT scan of the abdomen and pelvis with contrast is normal.  White blood cell count and lactate are normal, no pain out of proportion to exam, no evidence of ischemic colitis.  Stool somewhat looser however likely due to stool softener use no risk factors for infectious diarrhea or findings on CT scan to indicate need for stool testing at this time.  No evidence to suggest upper GI bleed and suspect hemorrhoids likely source of bleeding.  We will continue stool softeners, switch to lidocaine hydrocortisone combination cream, and refer to colon rectal surgery as may have internal hemorrhoids or need for a anoscopy or colonoscopy as well.  Return precautions given for fever, increasing pain or bleeding, vomiting blood, or other new or worsening symptoms.  Diagnosis:     ICD-10-CM    1. Hematochezia  K92.1    2. Hemorrhoids, unspecified hemorrhoid type  K64.9    3. Abdominal pain, generalized  R10.84        Discharge Medications:  New Prescriptions    LIDOCAINE-HYDROCORT, PERIANAL, 3-0.5 % CREA    Externally apply 1 Application topically 2 times daily for 10 days      Scribe Disclosure:  I, Binta Bey, am serving as a scribe on 4/18/2021 at 11:14 AM to personally document services performed by Ryan Gage PA-C based on my observations and the provider's statements to me.      4/18/2021   EMERGENCY DEPARTMENT     Ryan Gage PA-C  04/18/21 1311

## 2021-04-18 NOTE — ED TRIAGE NOTES
Patient presents to the ED reporting black stools and abdominal pain x 1 week. States is passing some clots.

## 2021-06-22 ENCOUNTER — HOSPITAL ENCOUNTER (OUTPATIENT)
Facility: CLINIC | Age: 46
Setting detail: OBSERVATION
Discharge: HOME | End: 2021-06-23
Attending: EMERGENCY MEDICINE | Admitting: HOSPITALIST
Payer: COMMERCIAL

## 2021-06-22 ENCOUNTER — APPOINTMENT (OUTPATIENT)
Dept: MRI IMAGING | Facility: CLINIC | Age: 46
End: 2021-06-22
Attending: HOSPITALIST
Payer: COMMERCIAL

## 2021-06-22 DIAGNOSIS — M54.16 LUMBAR RADICULOPATHY: ICD-10-CM

## 2021-06-22 DIAGNOSIS — K59.00 CONSTIPATION, UNSPECIFIED CONSTIPATION TYPE: Primary | ICD-10-CM

## 2021-06-22 DIAGNOSIS — M54.42 ACUTE BILATERAL LOW BACK PAIN WITH LEFT-SIDED SCIATICA: ICD-10-CM

## 2021-06-22 LAB
ANION GAP SERPL CALCULATED.3IONS-SCNC: 3 MMOL/L (ref 3–14)
BASOPHILS # BLD AUTO: 0 10E9/L (ref 0–0.2)
BASOPHILS NFR BLD AUTO: 0.1 %
BUN SERPL-MCNC: 13 MG/DL (ref 7–30)
CALCIUM SERPL-MCNC: 9.1 MG/DL (ref 8.5–10.1)
CHLORIDE SERPL-SCNC: 105 MMOL/L (ref 94–109)
CO2 SERPL-SCNC: 27 MMOL/L (ref 20–32)
CREAT SERPL-MCNC: 0.8 MG/DL (ref 0.66–1.25)
DIFFERENTIAL METHOD BLD: ABNORMAL
EOSINOPHIL # BLD AUTO: 0 10E9/L (ref 0–0.7)
EOSINOPHIL NFR BLD AUTO: 0 %
ERYTHROCYTE [DISTWIDTH] IN BLOOD BY AUTOMATED COUNT: 13.2 % (ref 10–15)
GFR SERPL CREATININE-BSD FRML MDRD: >90 ML/MIN/{1.73_M2}
GLUCOSE SERPL-MCNC: 130 MG/DL (ref 70–99)
HCT VFR BLD AUTO: 47 % (ref 40–53)
HGB BLD-MCNC: 15.6 G/DL (ref 13.3–17.7)
IMM GRANULOCYTES # BLD: 0 10E9/L (ref 0–0.4)
IMM GRANULOCYTES NFR BLD: 0.4 %
LABORATORY COMMENT REPORT: NORMAL
LYMPHOCYTES # BLD AUTO: 0.5 10E9/L (ref 0.8–5.3)
LYMPHOCYTES NFR BLD AUTO: 7 %
MCH RBC QN AUTO: 31.6 PG (ref 26.5–33)
MCHC RBC AUTO-ENTMCNC: 33.2 G/DL (ref 31.5–36.5)
MCV RBC AUTO: 95 FL (ref 78–100)
MONOCYTES # BLD AUTO: 0.1 10E9/L (ref 0–1.3)
MONOCYTES NFR BLD AUTO: 0.7 %
NEUTROPHILS # BLD AUTO: 6.9 10E9/L (ref 1.6–8.3)
NEUTROPHILS NFR BLD AUTO: 91.8 %
NRBC # BLD AUTO: 0 10*3/UL
NRBC BLD AUTO-RTO: 0 /100
PLATELET # BLD AUTO: 231 10E9/L (ref 150–450)
POTASSIUM SERPL-SCNC: 4.2 MMOL/L (ref 3.4–5.3)
RBC # BLD AUTO: 4.93 10E12/L (ref 4.4–5.9)
SARS-COV-2 RNA RESP QL NAA+PROBE: NEGATIVE
SODIUM SERPL-SCNC: 136 MMOL/L (ref 133–144)
SPECIMEN SOURCE: NORMAL
WBC # BLD AUTO: 7.5 10E9/L (ref 4–11)

## 2021-06-22 PROCEDURE — 80048 BASIC METABOLIC PNL TOTAL CA: CPT | Performed by: EMERGENCY MEDICINE

## 2021-06-22 PROCEDURE — 85025 COMPLETE CBC W/AUTO DIFF WBC: CPT | Performed by: EMERGENCY MEDICINE

## 2021-06-22 PROCEDURE — 250N000011 HC RX IP 250 OP 636: Performed by: EMERGENCY MEDICINE

## 2021-06-22 PROCEDURE — G0378 HOSPITAL OBSERVATION PER HR: HCPCS

## 2021-06-22 PROCEDURE — 87635 SARS-COV-2 COVID-19 AMP PRB: CPT | Performed by: EMERGENCY MEDICINE

## 2021-06-22 PROCEDURE — 72148 MRI LUMBAR SPINE W/O DYE: CPT

## 2021-06-22 PROCEDURE — 250N000013 HC RX MED GY IP 250 OP 250 PS 637: Performed by: EMERGENCY MEDICINE

## 2021-06-22 PROCEDURE — 96374 THER/PROPH/DIAG INJ IV PUSH: CPT

## 2021-06-22 PROCEDURE — 250N000013 HC RX MED GY IP 250 OP 250 PS 637: Performed by: HOSPITALIST

## 2021-06-22 PROCEDURE — 99220 PR INITIAL OBSERVATION CARE,LEVEL III: CPT | Performed by: HOSPITALIST

## 2021-06-22 PROCEDURE — 250N000012 HC RX MED GY IP 250 OP 636 PS 637: Performed by: EMERGENCY MEDICINE

## 2021-06-22 PROCEDURE — 99285 EMERGENCY DEPT VISIT HI MDM: CPT | Mod: 25

## 2021-06-22 PROCEDURE — C9803 HOPD COVID-19 SPEC COLLECT: HCPCS

## 2021-06-22 RX ORDER — NALOXONE HYDROCHLORIDE 0.4 MG/ML
0.4 INJECTION, SOLUTION INTRAMUSCULAR; INTRAVENOUS; SUBCUTANEOUS
Status: DISCONTINUED | OUTPATIENT
Start: 2021-06-22 | End: 2021-06-23 | Stop reason: HOSPADM

## 2021-06-22 RX ORDER — OXYCODONE HYDROCHLORIDE 5 MG/1
5-10 TABLET ORAL EVERY 4 HOURS PRN
Status: DISCONTINUED | OUTPATIENT
Start: 2021-06-22 | End: 2021-06-23

## 2021-06-22 RX ORDER — DIAZEPAM 5 MG
5 TABLET ORAL EVERY 6 HOURS PRN
Status: DISCONTINUED | OUTPATIENT
Start: 2021-06-22 | End: 2021-06-23

## 2021-06-22 RX ORDER — GABAPENTIN 100 MG/1
100 CAPSULE ORAL 3 TIMES DAILY
Qty: 30 CAPSULE | Refills: 0 | Status: SHIPPED | OUTPATIENT
Start: 2021-06-22 | End: 2021-06-23

## 2021-06-22 RX ORDER — ONDANSETRON 4 MG/1
4 TABLET, ORALLY DISINTEGRATING ORAL ONCE
Status: COMPLETED | OUTPATIENT
Start: 2021-06-22 | End: 2021-06-22

## 2021-06-22 RX ORDER — ONDANSETRON 4 MG/1
4 TABLET, ORALLY DISINTEGRATING ORAL EVERY 6 HOURS PRN
Status: DISCONTINUED | OUTPATIENT
Start: 2021-06-22 | End: 2021-06-23 | Stop reason: HOSPADM

## 2021-06-22 RX ORDER — NALOXONE HYDROCHLORIDE 0.4 MG/ML
0.2 INJECTION, SOLUTION INTRAMUSCULAR; INTRAVENOUS; SUBCUTANEOUS
Status: DISCONTINUED | OUTPATIENT
Start: 2021-06-22 | End: 2021-06-23 | Stop reason: HOSPADM

## 2021-06-22 RX ORDER — HYDROMORPHONE HYDROCHLORIDE 1 MG/ML
0.5 INJECTION, SOLUTION INTRAMUSCULAR; INTRAVENOUS; SUBCUTANEOUS
Status: DISCONTINUED | OUTPATIENT
Start: 2021-06-22 | End: 2021-06-23 | Stop reason: HOSPADM

## 2021-06-22 RX ORDER — OXYCODONE HYDROCHLORIDE 5 MG/1
5 TABLET ORAL EVERY 6 HOURS PRN
Qty: 12 TABLET | Refills: 0 | Status: SHIPPED | OUTPATIENT
Start: 2021-06-22 | End: 2021-06-23

## 2021-06-22 RX ORDER — ACETAMINOPHEN 325 MG/1
650 TABLET ORAL EVERY 4 HOURS PRN
Status: DISCONTINUED | OUTPATIENT
Start: 2021-06-22 | End: 2021-06-23 | Stop reason: HOSPADM

## 2021-06-22 RX ORDER — OXYCODONE HYDROCHLORIDE 5 MG/1
5 TABLET ORAL ONCE
Status: COMPLETED | OUTPATIENT
Start: 2021-06-22 | End: 2021-06-22

## 2021-06-22 RX ORDER — IBUPROFEN 200 MG
400-800 TABLET ORAL EVERY 6 HOURS PRN
Status: ON HOLD | COMMUNITY
End: 2024-02-01

## 2021-06-22 RX ORDER — ONDANSETRON 2 MG/ML
4 INJECTION INTRAMUSCULAR; INTRAVENOUS EVERY 6 HOURS PRN
Status: DISCONTINUED | OUTPATIENT
Start: 2021-06-22 | End: 2021-06-23 | Stop reason: HOSPADM

## 2021-06-22 RX ORDER — METHYLPREDNISOLONE 4 MG
TABLET, DOSE PACK ORAL
Qty: 21 TABLET | Refills: 0 | Status: SHIPPED | OUTPATIENT
Start: 2021-06-22 | End: 2021-06-23

## 2021-06-22 RX ORDER — LIDOCAINE 4 G/G
1 PATCH TOPICAL ONCE
Status: COMPLETED | OUTPATIENT
Start: 2021-06-22 | End: 2021-06-22

## 2021-06-22 RX ORDER — GABAPENTIN 100 MG/1
100 CAPSULE ORAL 3 TIMES DAILY
Status: DISCONTINUED | OUTPATIENT
Start: 2021-06-22 | End: 2021-06-23 | Stop reason: HOSPADM

## 2021-06-22 RX ORDER — TIZANIDINE 2 MG/1
2 TABLET ORAL 3 TIMES DAILY
Qty: 20 TABLET | Refills: 0 | Status: SHIPPED | OUTPATIENT
Start: 2021-06-22 | End: 2021-06-23

## 2021-06-22 RX ORDER — ACETAMINOPHEN 500 MG
1000 TABLET ORAL 3 TIMES DAILY
Status: DISCONTINUED | OUTPATIENT
Start: 2021-06-22 | End: 2021-06-23 | Stop reason: HOSPADM

## 2021-06-22 RX ORDER — HYDROMORPHONE HYDROCHLORIDE 1 MG/ML
0.5 INJECTION, SOLUTION INTRAMUSCULAR; INTRAVENOUS; SUBCUTANEOUS ONCE
Status: COMPLETED | OUTPATIENT
Start: 2021-06-22 | End: 2021-06-22

## 2021-06-22 RX ORDER — DIAZEPAM 5 MG
5 TABLET ORAL ONCE
Status: COMPLETED | OUTPATIENT
Start: 2021-06-22 | End: 2021-06-22

## 2021-06-22 RX ORDER — LIDOCAINE 50 MG/G
1 PATCH TOPICAL EVERY 24 HOURS
Qty: 10 PATCH | Refills: 0 | Status: SHIPPED | OUTPATIENT
Start: 2021-06-22 | End: 2021-06-23

## 2021-06-22 RX ORDER — ACETAMINOPHEN 650 MG/1
650 SUPPOSITORY RECTAL EVERY 4 HOURS PRN
Status: DISCONTINUED | OUTPATIENT
Start: 2021-06-22 | End: 2021-06-23 | Stop reason: HOSPADM

## 2021-06-22 RX ORDER — ACETAMINOPHEN 500 MG
1000 TABLET ORAL ONCE
Status: COMPLETED | OUTPATIENT
Start: 2021-06-22 | End: 2021-06-22

## 2021-06-22 RX ADMIN — DEXAMETHASONE 10 MG: 2 TABLET ORAL at 08:03

## 2021-06-22 RX ADMIN — OXYCODONE HYDROCHLORIDE 10 MG: 5 TABLET ORAL at 21:40

## 2021-06-22 RX ADMIN — GABAPENTIN 100 MG: 100 CAPSULE ORAL at 08:04

## 2021-06-22 RX ADMIN — GABAPENTIN 100 MG: 100 CAPSULE ORAL at 16:00

## 2021-06-22 RX ADMIN — OXYCODONE HYDROCHLORIDE 5 MG: 5 TABLET ORAL at 16:00

## 2021-06-22 RX ADMIN — OXYCODONE HYDROCHLORIDE 5 MG: 5 TABLET ORAL at 08:03

## 2021-06-22 RX ADMIN — ONDANSETRON 4 MG: 4 TABLET, ORALLY DISINTEGRATING ORAL at 06:12

## 2021-06-22 RX ADMIN — OXYCODONE HYDROCHLORIDE 5 MG: 5 TABLET ORAL at 09:32

## 2021-06-22 RX ADMIN — ACETAMINOPHEN 1000 MG: 500 TABLET, FILM COATED ORAL at 16:00

## 2021-06-22 RX ADMIN — DIAZEPAM 5 MG: 5 TABLET ORAL at 10:18

## 2021-06-22 RX ADMIN — HYDROMORPHONE HYDROCHLORIDE 0.5 MG: 1 INJECTION, SOLUTION INTRAMUSCULAR; INTRAVENOUS; SUBCUTANEOUS at 12:42

## 2021-06-22 RX ADMIN — GABAPENTIN 100 MG: 100 CAPSULE ORAL at 21:40

## 2021-06-22 RX ADMIN — LIDOCAINE 1 PATCH: 246 PATCH TOPICAL at 08:04

## 2021-06-22 RX ADMIN — ACETAMINOPHEN 1000 MG: 500 TABLET, FILM COATED ORAL at 21:39

## 2021-06-22 RX ADMIN — ACETAMINOPHEN 1000 MG: 500 TABLET, FILM COATED ORAL at 07:34

## 2021-06-22 ASSESSMENT — ENCOUNTER SYMPTOMS
DIAPHORESIS: 1
BACK PAIN: 1
ABDOMINAL PAIN: 0

## 2021-06-22 ASSESSMENT — MIFFLIN-ST. JEOR: SCORE: 1814.78

## 2021-06-22 NOTE — PHARMACY-ADMISSION MEDICATION HISTORY
Admission medication history interview status for this patient is complete. See [facility] admission navigator for allergy information, prior to admission medications and immunization status.     Medication history interview done, indicate source(s): Patient  Medication history resources (including written lists, pill bottles, clinic record):None  Pharmacy: N/A    Changes made to PTA medication list:  Added: Tylenol; Ibuprofen;   Deleted: Senna; Mag citrate; Bisacodyl; Norco;   Changed:   1. Omeprazole 20mg daily --> 20mg daily PRN    Actions taken by pharmacist (provider contacted, etc):None     Additional medication history information:  Please see medication list below for NEW medications prescribed in ED that were not filled as patient was instead admitted.     Medication reconciliation/reorder completed by provider prior to medication history?  No      Prior to Admission medications    Medication Sig Last Dose Taking? Auth Provider   acetaminophen (TYLENOL) 325 MG tablet Take 650 mg by mouth every 4 hours as needed  6/22/2021 at AM Yes Reported, Patient   gabapentin (NEURONTIN) 100 MG capsule Take 1 capsule (100 mg) by mouth 3 times daily New Rx for discharge Yes Rakesh Soler MD   ibuprofen (ADVIL/MOTRIN) 200 MG tablet Take 400-800 mg by mouth every 6 hours as needed for pain 6/22/2021 at AM Yes Unknown, Entered By History   lidocaine (LIDODERM) 5 % patch Place 1 patch onto the skin every 24 hours for 10 days New Rx for discharge Yes Rakesh Soler MD   methylPREDNISolone (MEDROL DOSEPAK) 4 MG tablet therapy pack Follow Package Directions New Rx for discharge Yes Rakesh Soler MD   omeprazole (PRILOSEC) 20 MG DR capsule Take 20 mg by mouth daily as needed Past Month at Unknown time Yes Unknown, Entered By History   oxyCODONE (ROXICODONE) 5 MG tablet Take 1 tablet (5 mg) by mouth every 6 hours as needed for pain New Rx for discharge Yes Rakesh Soler MD   tiZANidine (ZANAFLEX) 2 MG  tablet Take 1 tablet (2 mg) by mouth 3 times daily New Rx for discharge Yes Rakesh Soler MD

## 2021-06-22 NOTE — PLAN OF CARE
PRIMARY DIAGNOSIS: ACUTE PAIN  OUTPATIENT/OBSERVATION GOALS TO BE MET BEFORE DISCHARGE:  1. Pain Status: Improved-controlled with oral pain medications.    2. Return to near baseline physical activity: No    3. Cleared for discharge by consultants (if involved): No    Discharge Planner Nurse   Safe discharge environment identified: No  Barriers to discharge: Yes       Entered by: Meg Russell 06/22/2021 5:51 PM     Please review provider order for any additional goals.   Nurse to notify provider when observation goals have been met and patient is ready for discharge.

## 2021-06-22 NOTE — PLAN OF CARE
ROOM # 203    Living Situation (if not independent, order SW consult):  Facility name:  : [facility] [phone]     Activity level at baseline: Indep  Activity level on admit: total- unable to walk      Patient registered to observation; given Patient Bill of Rights; given the opportunity to ask questions about observation status and their plan of care.  Patient has been oriented to the observation room, bathroom and call light is in place.    Discussed discharge goals and expectations with patient/family.

## 2021-06-22 NOTE — ED PROVIDER NOTES
"  History   Chief Complaint:     STANISLAV Presley is a 45 year old male with history of bilateral low back pain with left-sided sciatica who presents with back pain. Patient is complaining of back pain which radiates to his right leg laterally and causes him a tingling sensation. He has a history of chronic back pain but the presentation is on his right leg. He presents with associated diaphoresis. He states his pain exacerbates with movement and feels like a \"knife is cutting\" him. He states he had back surgery 15-20 years ago. He works in construction and works in manual labor. He denies abdominal pain or bowel incontinence. He has been taking ibuprofen with no relief to his symptoms.    Review of Systems   Constitutional: Positive for diaphoresis.   Gastrointestinal: Negative for abdominal pain.   Musculoskeletal: Positive for back pain.   All other systems reviewed and are negative.        Allergies:  No known drug allergies    Medications:  Omeprazole  Senna    Past Medical History:    Gastroesophageal Reflux Disease  Bilateral Low Back Pain with Left-Sided Sciatica      Past Surgical History:    Back Surgery  Tonsillectomy   Wrist Surgery     Family History:    Father - Lymphoma, Colon Cancer    Social History:  Arrives with his daughters to the emergency department.     Physical Exam     Patient Vitals for the past 24 hrs:   BP Temp Temp src Pulse Resp SpO2 Height Weight   06/22/21 1000 121/73 -- -- 72 15 96 % -- --   06/22/21 0541 123/86 97  F (36.1  C) Temporal 83 20 99 % 1.905 m (6' 3\") 83.9 kg (185 lb)       Physical Exam  Vitals signs reviewed.   HENT:      Head: Normocephalic.   Cardiovascular:      Rate and Rhythm: Normal rate.   Pulmonary:      Effort: Pulmonary effort is normal.   Abdominal:      General: Abdomen is flat.      Palpations: Abdomen is soft.   Musculoskeletal:      Comments: Pain localizes to the right back and radiates down the right leg to the lateral aspect of the right knee.  " No weakness identified.  Normal sensation normal distal reflexes.   Skin:     General: Skin is warm.      Capillary Refill: Capillary refill takes less than 2 seconds.   Neurological:      General: No focal deficit present.      Mental Status: He is alert. Mental status is at baseline.   Psychiatric:         Mood and Affect: Mood normal.           Emergency Department Course     Laboratory:  CBC: WBC 7.5, HGB 15.6,   BMP: Glucose 130 (H), o/w WNL (Creatinine: 0.80)    Asymptomatic COVID19 Virus PCR by nasopharyngeal swab: Negative     Emergency Department Course:    Reviewed:  I reviewed nursing notes, vitals, past medical history and care everywhere    Assessments:  0743 I obtained history and examined the patient as noted above.   0858 I rechecked the patient and explained findings. His pain has improved to 7/10.   0954 I rechecked on the patient.  1129 I rechecked the patient.    Consults:  1218 I consulted with Dr. Jasso, on call Hospitalist, regarding the patient's history and presentation here in the emergency department.    Interventions:  0612 Zofran mg IV   0734 Tylenol 1,000 mg IV   0803 Oxycodone 5 mg PO  0803 Dexamethasone 10 mg PO   0804 Gabapentin 100 mg PO  0804 Lidocaine % 1 Patch   0932 Oxycodone 5 mg PO  1018 Valium 5 mg PO  1242 Dilaudid 0.5 mg IV     Disposition:  The patient was admitted under the care of Dr. Jasso.     Impression & Plan     Medical Decision Making:  Patient presents with severe back pain.  Patient is a history of prior lumbar surgery.  No clear clinical concern for discitis epidural abscess or cauda equina.  Patient has severe pain and is a difficulty in walking.  Multiple doses of oral pain medications and IV pain medications were offered without relief patient continued to have trouble even standing and therefore was admitted on observation due to intractable back pain.  Care was discussed with the observation hospitalist.    Covid-19  Yordy Presley was evaluated  during a global COVID-19 pandemic, which necessitated consideration that the patient might be at risk for infection with the SARS-CoV-2 virus that causes COVID-19.   Applicable protocols for evaluation were followed during the patient's care. COVID-19 was considered as part of the patient's evaluation. The plan for testing is: a test was obtained during this visit which returned negative.     Diagnosis:    ICD-10-CM    1. Lumbar radiculopathy  M54.16 CBC with platelets differential     Basic metabolic panel     Asymptomatic SARS-CoV-2 COVID-19 Virus (Coronavirus) by PCR       Discharge Medications:  New Prescriptions    GABAPENTIN (NEURONTIN) 100 MG CAPSULE    Take 1 capsule (100 mg) by mouth 3 times daily    LIDOCAINE (LIDODERM) 5 % PATCH    Place 1 patch onto the skin every 24 hours for 10 days    METHYLPREDNISOLONE (MEDROL DOSEPAK) 4 MG TABLET THERAPY PACK    Follow Package Directions    OXYCODONE (ROXICODONE) 5 MG TABLET    Take 1 tablet (5 mg) by mouth every 6 hours as needed for pain    TIZANIDINE (ZANAFLEX) 2 MG TABLET    Take 1 tablet (2 mg) by mouth 3 times daily       Scribe Disclosure:  RL, Lazaro Casper, am serving as a scribe at 7:01 AM on 6/22/2021 to document services personally performed by Rakesh Soler MD based on my observations and the provider's statements to me.              Rakesh Soler MD  06/24/21 3006

## 2021-06-22 NOTE — H&P
[facility]    History and Physical - Hospitalist Service       Date of Admission:  6/22/2021    Assessment & Plan      Yordy Presley is a 45 year old male with history of hemorrhoids, GERD, previous lumbar spine surgery with revision (see below) admitted on 6/22/2021 with acute onset low back pain    Acute on chronic back pain    - had previous surgery with most recent being a revision with Dr. Chawla ([facility]) at [facility]:  Removal of deep implantation pedicle screws left, L4, L5, and S1; short couplers and longitudinal connecting liliana; as well a Crosslink low-profile between the pedicle screws of L5 and S1; with retention of the right instrumentation.   2. Revision decompression, left L5-S1 nerve roots, via revision left L5-S1 transforaminal/transfacet decompression of the exiting left 5th and traversing left S1 roots.   3. Revision decompression left L5-S1 hemilaminotomy and subarticular decompression of the traversing left S1 root.     - will obtain MRI lumbar spine    - pain control with Oxy, valium and dilaudid, tylenol    - received one dose steroid in the ED, will not continue until I see the MRI    - NPO after midnight in case he needs surgery    GERD    - cont PPI    Full code    Wife in room. Updated and questions answered.     Diet:  regular  DVT Prophylaxis: Enoxaparin (Lovenox) subcutaneous- start tomorrow if does not need surgery  [place] Catheter: Not present  Central Lines: None  Code Status:   Full code    Risk Factors Present on Admission                   Disposition Plan   Expected discharge: 1-2 days, recommended to prior living arrangement once adequate pain management/ tolerating PO medications.     The patient's care was discussed with the Patient, Patient's Family and ED Provider.    Hadley Jasso MD  [facility]  Securely message with the Vocera Web Console (learn more here)  Text page via [employer]  Paging/Directory      ______________________________________________________________________    Chief Complaint   Back pain    History is obtained from the patient    History of Present Illness   Yordy Presley is a 45 year old male with history of hemorrhoids, GERD, previous lumbar spine surgery with revision (see below) admitted on 6/22/2021 with acute onset low back pain.  Patient has been in his normal state of health.  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  At work he was shoveling some dirt which was not that heavy, and suddenly had acute low back pain when twisting.  He states the pain is in his low back on the right side and goes down his buttocks to his knee.  He states he has some numbness and tingling of his anterior thigh.  He denies any urinary or fecal incontinence.  He states he was able to get home with help.  He was not able to move all night.  He did not sleep because of the pain.  To get to the hospital his family had to almost carry him while he was using a walker to carry his weight.  He denies any chest pain, shortness of breath, abdominal pain, nausea vomiting or diarrhea.  He denies any cough, runny nose, sore throat.  No fevers or chills.  No urinary symptoms.    Review of Systems    The 10 point Review of Systems is negative other than noted in the HPI or here.     Past Medical History    I have reviewed this patient's medical history and updated it with pertinent information if needed.   Past Medical History:   Diagnosis Date     NO ACTIVE PROBLEMS        Past Surgical History   I have reviewed this patient's surgical history and updated it with pertinent information if needed.  Past Surgical History:   Procedure Laterality Date     BACK SURGERY       BACK SURGERY      fusion - 6- 8 yrs ago     TONSILLECTOMY       WRIST SURGERY         Social History   I have reviewed this patient's social history and updated it with  pertinent information if needed.  Social History     Tobacco Use     Smoking status: Never Smoker     Smokeless tobacco: Never Used   Substance Use Topics     Alcohol use: No     Drug use: No       Family History   I have reviewed this patient's family history and updated it with pertinent information if needed.  Family History   Problem Relation Age of Onset     Lymphoma Father 85     Cancer Father      Colon Cancer Father        Prior to Admission Medications   Prior to Admission Medications   Prescriptions Last Dose Informant Patient Reported? Taking?   acetaminophen (TYLENOL) 325 MG tablet 6/22/2021 at AM  Yes Yes   Sig: Take 650 mg by mouth every 4 hours as needed    ibuprofen (ADVIL/MOTRIN) 200 MG tablet 6/22/2021 at AM  Yes Yes   Sig: Take 400-800 mg by mouth every 6 hours as needed for pain   methylPREDNISolone (MEDROL DOSEPAK) 4 MG tablet   No No   Sig: Follow package instructions   omeprazole (PRILOSEC) 20 MG DR capsule Past Month at Unknown time  Yes Yes   Sig: Take 20 mg by mouth daily as needed      Facility-Administered Medications: None     Allergies   No Known Allergies    Physical Exam   Vital Signs: Temp: 97.4  F (36.3  C) Temp src: Oral BP: 104/76 Pulse: 73   Resp: 16 SpO2: 98 % O2 Device: None (Room air)    Weight: 185 lbs 0 oz    Constitutional: awake, alert, cooperative, no apparent distress, and appears stated age  Eyes: Lids and lashes normal, pupils equal, round and reactive to light, extra ocular muscles intact, sclera clear, conjunctiva normal  ENT: Normocephalic, without obvious abnormality, atraumatic, sinuses nontender on palpation, external ears without lesions, oral pharynx with moist mucous membranes, tonsils without erythema or exudates, gums normal and good dentition.  Respiratory: No increased work of breathing, good air exchange, clear to auscultation bilaterally, no crackles or wheezing  Cardiovascular: Normal apical impulse, regular rate and rhythm, normal S1 and S2, no S3 or  S4, and no murmur noted  GI: No scars, normal bowel sounds, soft, non-distended, non-tender, no masses palpated, no hepatosplenomegally  Skin: no bruising or bleeding  Musculoskeletal: difficult for him to move in bed. Has lumbar right paraspinal tenderness. Able to lift his leg off the bed with some pain. Good strength    Data   Data reviewed today: I reviewed all medications, new labs and imaging results over the last 24 hours. I personally reviewed no images or EKG's today.    Most Recent 3 CBC's:  Recent Labs   Lab Test 06/22/21  1243 04/18/21  1132 07/25/17  0915   WBC 7.5 6.4 7.0   HGB 15.6 15.0 16.3   MCV 95 97 94    233 223     Most Recent 3 BMP's:  Recent Labs   Lab Test 06/22/21  1243 04/18/21  1132 07/25/17  0915    138 140   POTASSIUM 4.2 4.0 4.4   CHLORIDE 105 107 103   CO2 27 29 31   BUN 13 13 14   CR 0.80 0.86 0.87   ANIONGAP 3 2* 6   MARISOL 9.1 8.6 9.3   * 99 100*     Most Recent 2 LFT's:  Recent Labs   Lab Test 04/18/21  1132 07/25/17  0915   AST 22 16   ALT 36 26   ALKPHOS 80 73   BILITOTAL 0.5 0.7     No results found for this or any previous visit (from the past 24 hour(s)).

## 2021-06-22 NOTE — ED TRIAGE NOTES
Here for right lower acute on chronic back pain since yesterday morning associated sweating, tingling to right leg. Pain worse with any movement, feels like cutting with a knife and radiating down right side of leg. Stated history of 2 back surgery about 15-20 years ago, has had back pain issues since. Taking ibuprofen but not helping. Denies any loss of bowel/bladder control. ABCs intact.

## 2021-06-22 NOTE — ED NOTES
This writer attempted to transfer patient from bed to wheelchair for discharge. Patient was unable to transfer to wheelchair due to sever lower back pain. Orders received.

## 2021-06-22 NOTE — ED NOTES
"[facility]  ED Nurse Handoff Report    Yordy Presley is a 45 year old male   ED Chief complaint: Back Pain  . ED Diagnosis:   Final diagnoses:   Lumbar radiculopathy     Allergies: No Known Allergies    Code Status: Full Code  Activity level - Baseline/Home:  Independent. Activity Level - Current:   Assist X 1. Lift room needed: No. Bariatric: No   Needed: No   Isolation: No. Infection: Not Applicable.     Vital Signs:   Vitals:    06/22/21 0541 06/22/21 1000   BP: 123/86 121/73   Pulse: 83 72   Resp: 20 15   Temp: 97  F (36.1  C)    TempSrc: Temporal    SpO2: 99% 96%   Weight: 83.9 kg (185 lb)    Height: 1.905 m (6' 3\")        Cardiac Rhythm:  ,      Pain level:    Patient confused: No. Patient Falls Risk: Yes.   Elimination Status: Has voided   Patient Report - Initial Complaint: Back Pain. Focused Assessment: Patient presents with lower back pain radiating down legs bilaterally with difficulty ambulating. Patient usually ambulatory independently. Hx of lumbar fusion with hardware. Denies numbness or tingling. No incontinence of bowel or bladder.  Tests Performed: labs, PO medication, IV medication. Abnormal Results:   Labs Ordered and Resulted from Time of ED Arrival Up to the Time of Departure from the ED   CBC WITH PLATELETS DIFFERENTIAL - Abnormal; Notable for the following components:       Result Value    Absolute Lymphocytes 0.5 (*)     All other components within normal limits   BASIC METABOLIC PANEL   SARS-COV-2 (COVID-19) VIRUS RT-PCR   PERIPHERAL IV CATHETER   .   Treatments provided: see MAR  Family Comments: Difficult to care for patient at home due to difficulty ambulating.  OBS brochure/video discussed/provided to patient:  Yes  ED Medications:   Medications   gabapentin (NEURONTIN) capsule 100 mg (100 mg Oral Given 6/22/21 0804)   Lidocaine (LIDOCARE) 4 % Patch 1 patch (1 patch Transdermal Patch/Med Applied 6/22/21 0804)   ondansetron (ZOFRAN-ODT) ODT tab 4 mg (4 mg Oral " Given 6/22/21 0612)   acetaminophen (TYLENOL) tablet 1,000 mg (1,000 mg Oral Given 6/22/21 0734)   oxyCODONE (ROXICODONE) tablet 5 mg (5 mg Oral Given 6/22/21 0803)   dexamethasone (DECADRON) tablet 10 mg (10 mg Oral Given 6/22/21 0803)   oxyCODONE (ROXICODONE) tablet 5 mg (5 mg Oral Given 6/22/21 0932)   diazepam (VALIUM) tablet 5 mg (5 mg Oral Given 6/22/21 1018)   HYDROmorphone (PF) (DILAUDID) injection 0.5 mg (0.5 mg Intravenous Given 6/22/21 1242)     Drips infusing:  No  For the majority of the shift, the patient's behavior Green. Interventions performed were IV initiation, PO and IV medication, COVID swab.    Sepsis treatment initiated: No     Patient tested for COVID 19 prior to admission: YES    ED Nurse Name/Phone Number: Justin Whitt RN,   1:08 PM    RECEIVING UNIT ED HANDOFF REVIEW    Above ED Nurse Handoff Report was reviewed: Yes  Reviewed by: Joyce Barrientos RN on June 22, 2021 at 1:46 PM

## 2021-06-23 VITALS
RESPIRATION RATE: 18 BRPM | WEIGHT: 185 LBS | BODY MASS INDEX: 23 KG/M2 | HEIGHT: 75 IN | OXYGEN SATURATION: 96 % | SYSTOLIC BLOOD PRESSURE: 132 MMHG | TEMPERATURE: 98.3 F | DIASTOLIC BLOOD PRESSURE: 79 MMHG | HEART RATE: 76 BPM

## 2021-06-23 PROCEDURE — 99217 PR OBSERVATION CARE DISCHARGE: CPT | Performed by: HOSPITALIST

## 2021-06-23 PROCEDURE — 250N000013 HC RX MED GY IP 250 OP 250 PS 637: Performed by: EMERGENCY MEDICINE

## 2021-06-23 PROCEDURE — G0378 HOSPITAL OBSERVATION PER HR: HCPCS

## 2021-06-23 PROCEDURE — L0637 LSO SC R ANT/POS PNL PRE CST: HCPCS

## 2021-06-23 PROCEDURE — 250N000013 HC RX MED GY IP 250 OP 250 PS 637: Performed by: HOSPITALIST

## 2021-06-23 PROCEDURE — 250N000012 HC RX MED GY IP 250 OP 636 PS 637: Performed by: HOSPITALIST

## 2021-06-23 RX ORDER — METHYLPREDNISOLONE 4 MG/1
4 TABLET ORAL
Status: DISCONTINUED | OUTPATIENT
Start: 2021-06-24 | End: 2021-06-23 | Stop reason: HOSPADM

## 2021-06-23 RX ORDER — METHYLPREDNISOLONE 4 MG/1
8 TABLET ORAL AT BEDTIME
Status: DISCONTINUED | OUTPATIENT
Start: 2021-06-23 | End: 2021-06-23 | Stop reason: HOSPADM

## 2021-06-23 RX ORDER — OXYCODONE HYDROCHLORIDE 5 MG/1
10 TABLET ORAL EVERY 4 HOURS PRN
Status: DISCONTINUED | OUTPATIENT
Start: 2021-06-23 | End: 2021-06-23 | Stop reason: HOSPADM

## 2021-06-23 RX ORDER — TIZANIDINE 2 MG/1
2 TABLET ORAL 3 TIMES DAILY PRN
Qty: 20 TABLET | Refills: 0 | Status: SHIPPED | OUTPATIENT
Start: 2021-06-23 | End: 2024-01-31

## 2021-06-23 RX ORDER — DIAZEPAM 10 MG
10 TABLET ORAL EVERY 6 HOURS PRN
Qty: 20 TABLET | Refills: 0 | Status: ON HOLD | OUTPATIENT
Start: 2021-06-23 | End: 2024-02-06

## 2021-06-23 RX ORDER — METHYLPREDNISOLONE 4 MG/1
4 TABLET ORAL ONCE
Status: DISCONTINUED | OUTPATIENT
Start: 2021-06-23 | End: 2021-06-23 | Stop reason: HOSPADM

## 2021-06-23 RX ORDER — METHYLPREDNISOLONE 4 MG/1
8 TABLET ORAL ONCE
Status: COMPLETED | OUTPATIENT
Start: 2021-06-23 | End: 2021-06-23

## 2021-06-23 RX ORDER — METHYLPREDNISOLONE 4 MG
TABLET, DOSE PACK ORAL
Qty: 21 TABLET | Refills: 0 | Status: SHIPPED | OUTPATIENT
Start: 2021-06-23 | End: 2024-01-31

## 2021-06-23 RX ORDER — AMOXICILLIN 250 MG
1 CAPSULE ORAL 2 TIMES DAILY PRN
Qty: 20 TABLET | Refills: 0 | Status: ON HOLD | OUTPATIENT
Start: 2021-06-23 | End: 2024-02-06

## 2021-06-23 RX ORDER — OXYCODONE HYDROCHLORIDE 10 MG/1
5-10 TABLET ORAL EVERY 6 HOURS PRN
Qty: 15 TABLET | Refills: 0 | Status: SHIPPED | OUTPATIENT
Start: 2021-06-23 | End: 2024-01-31

## 2021-06-23 RX ORDER — DIAZEPAM 10 MG
10 TABLET ORAL EVERY 6 HOURS PRN
Status: DISCONTINUED | OUTPATIENT
Start: 2021-06-23 | End: 2021-06-23 | Stop reason: HOSPADM

## 2021-06-23 RX ORDER — METHYLPREDNISOLONE 4 MG/1
4 TABLET ORAL AT BEDTIME
Status: DISCONTINUED | OUTPATIENT
Start: 2021-06-25 | End: 2021-06-23 | Stop reason: HOSPADM

## 2021-06-23 RX ADMIN — ACETAMINOPHEN 1000 MG: 500 TABLET, FILM COATED ORAL at 08:40

## 2021-06-23 RX ADMIN — DIAZEPAM 10 MG: 10 TABLET ORAL at 08:40

## 2021-06-23 RX ADMIN — OXYCODONE HYDROCHLORIDE 5 MG: 5 TABLET ORAL at 04:20

## 2021-06-23 RX ADMIN — METHYLPREDNISOLONE 8 MG: 4 TABLET ORAL at 08:40

## 2021-06-23 RX ADMIN — GABAPENTIN 100 MG: 100 CAPSULE ORAL at 08:40

## 2021-06-23 NOTE — PLAN OF CARE
PRIMARY DIAGNOSIS: ACUTE PAIN  OUTPATIENT/OBSERVATION GOALS TO BE MET BEFORE DISCHARGE:  1. Pain Status: Improved-controlled with oral pain medications.    2. Return to near baseline physical activity: No    3. Cleared for discharge by consultants (if involved): No    Discharge Planner Nurse   Safe discharge environment identified: Yes  Barriers to discharge: Yes       Entered by: Amelia Emmanuel 06/23/2021      Pt moved with air mattress on to transport cart to go to MRI earlier. IV is SL. Pt A&Ox4.     Please review provider order for any additional goals.   Nurse to notify provider when observation goals have been met and patient is ready for discharge.

## 2021-06-23 NOTE — PLAN OF CARE
Patient's After Visit Summary was reviewed with patient and/or spouse.   Patient verbalized understanding of After Visit Summary, recommended follow up and was given an opportunity to ask questions.   Discharge medications sent home with patient/family: YES -- Medrol dose gerardo, Oxycodone, Flexeril, Valium.   Discharged with spouse      OBSERVATION patient END time: 12:16 PM

## 2021-06-23 NOTE — PLAN OF CARE
PRIMARY DIAGNOSIS: ACUTE PAIN  OUTPATIENT/OBSERVATION GOALS TO BE MET BEFORE DISCHARGE:  1. Pain Status: Improved-controlled with oral pain medications.    2. Return to near baseline physical activity: No    3. Cleared for discharge by consultants (if involved): No    Discharge Planner Nurse   Safe discharge environment identified: Yes  Barriers to discharge: Yes       Entered by: Amelia Emmanuel 06/23/2021 4:55 AM     Pt VSS. .Vital signs:  Temp: 97.9  F (36.6  C) Temp src: Oral BP: 119/63 Pulse: 81   Resp: 16 SpO2: 96 % O2 Device: None (Room air)     Pt rates pain at 6; oxycodone given. IV is SL. Pt has been NPO since 0000. Pt c/o a little numbness in his legs and toes, but stated it got better after moving it around.        Please review provider order for any additional goals.   Nurse to notify provider when observation goals have been met and patient is ready for discharge.

## 2021-06-23 NOTE — DISCHARGE SUMMARY
[facility]  Hospitalist Discharge Summary      Date of Admission:  6/22/2021  Date of Discharge:  6/23/2021  Discharging Provider: Hadley Jasso MD      Discharge Diagnoses   Lumbar back pain with radiculopathy    Follow-ups Needed After Discharge   Follow-up Appointments     Follow-up and recommended labs and tests       Follow up with primary care provider, [facility], within 7   days for hospital follow- up.  No follow up labs or test are needed.             Unresulted Labs Ordered in the Past 30 Days of this Admission     No orders found for last 31 day(s).      These results will be followed up by NA    Discharge Disposition   Discharged to home  Condition at discharge: Stable      Hospital Course   Yordy Presley is a 45 year old male with history of hemorrhoids, GERD, previous lumbar spine surgery with revision (see below) admitted on 6/22/2021 with acute onset low back pain.  Patient has been in his normal state of health.  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  At work he was shoveling some dirt which was not that heavy, and suddenly had acute low back pain when twisting.  He states the pain is in his low back on the right side and goes down his buttocks to his knee.  He states he has some numbness and tingling of his anterior thigh.  He denies any urinary or fecal incontinence.  He states he was able to get home with help.  He was not able to move all night.  He did not sleep because of the pain.  To get to the hospital his family had to almost carry him while he was using a walker to carry his weight.  He denies any chest pain, shortness of breath, abdominal pain, nausea vomiting or diarrhea.  He denies any cough, runny nose, sore throat.  No fevers or chills.  No urinary symptoms.     Patient was admitted to the observation unit.  His pain was controlled with oxycodone and Valium.  An MRI was done  which did not show any acute changes.  His pain is likely due to spasm and irritation of the nerve.  He is doing better today.  He was able to walk in the halls with a walker on his own.  He has a walker at home that he uses.  He will discharge with Medrol Dosepak, oxycodone, Valium, and tizanidine.  A physical therapy referral was placed.  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  He has been instructed not to do any heavy lifting for least 4 to 6 weeks.    Consultations This Hospital Stay   ORTHOSIS BRACE IP CONSULT    Code Status   Full Code    Time Spent on this Encounter   I, Hadley Jasso MD, personally saw the patient today and spent greater than 30 minutes discharging this patient.       Hadley Jasso MD  [facility] OBSERVATION DEPT  [address] [place] [address]  Phone: [phone]  ______________________________________________________________________    Physical Exam   Vital Signs: Temp: 98.1  F (36.7  C) Temp src: Oral BP: 102/59 Pulse: 68   Resp: 16 SpO2: 97 % O2 Device: None (Room air)    Weight: 185 lbs 0 oz  Constitutional: awake, alert, cooperative, no apparent distress, and appears stated age  Eyes: Lids and lashes normal, pupils equal, round and reactive to light, extra ocular muscles intact, sclera clear, conjunctiva normal  ENT: Normocephalic, without obvious abnormality, atraumatic, sinuses nontender on palpation, external ears without lesions, oral pharynx with moist mucous membranes, tonsils without erythema or exudates, gums normal and good dentition.  Respiratory: No increased work of breathing, good air exchange, clear to auscultation bilaterally, no crackles or wheezing  Cardiovascular: Normal apical impulse, regular rate and rhythm, normal S1 and S2, no S3 or S4, and no murmur noted  GI: No scars, normal bowel sounds, soft, non-distended, non-tender, no masses palpated, no hepatosplenomegally  Skin: no bruising or  bleeding  Musculoskeletal: no lower extremity pitting edema present       Primary Care Physician   [facility]    Discharge Orders      Physical Therapy Referral      Reason for your hospital stay    Lumbar back pain/radiculopathy due to spasm     Follow-up and recommended labs and tests     Follow up with primary care provider, [facility], within 7 days for hospital follow- up.  No follow up labs or test are needed.     Activity    Your activity upon discharge: activity as tolerated. You will need to rest for 1-2 weeks and no heavy lifting for 4-6 weeks     Diet    Follow this diet upon discharge: Orders Placed This Encounter      Regular Diet Adult       Significant Results and Procedures   Most Recent 3 CBC's:  Recent Labs   Lab Test 06/22/21  1243 04/18/21  1132 07/25/17  0915   WBC 7.5 6.4 7.0   HGB 15.6 15.0 16.3   MCV 95 97 94    233 223     Most Recent 3 BMP's:  Recent Labs   Lab Test 06/22/21  1243 04/18/21  1132 07/25/17  0915    138 140   POTASSIUM 4.2 4.0 4.4   CHLORIDE 105 107 103   CO2 27 29 31   BUN 13 13 14   CR 0.80 0.86 0.87   ANIONGAP 3 2* 6   MARISOL 9.1 8.6 9.3   * 99 100*   ,   Results for orders placed or performed during the hospital encounter of 06/22/21   MR Lumbar Spine w/o Contrast    Narrative    EXAM: MR LUMBAR SPINE W/O CONTRAST  LOCATION: [facility]  DATE/TIME: 6/22/2021 7:19 PM    INDICATION: Low back pain, prior surgery, new symptoms  COMPARISON: 6/30/2018. 4/18/2021.  TECHNIQUE: Routine Lumbar Spine MRI without IV contrast.    FINDINGS:   Conus is positioned at L1. Visualized portion the retroperitoneal structures including visualized portion of the kidneys, abdominal aorta and psoas musculature are clear.    Marrow signal within the lumbar vertebrae are clear. L1-L2: DDD change with loss of disc height and generalized disc bulging. Spinal canal and foramen are patent.    L2-L3: DDD change with loss of disc height and signal,  generalized disc bulging which results in mild spinal canal narrowing. Foramen are patent.     L3-L4: Mild degenerative change with loss of disc height and bilateral facet degeneration. Spinal canal and foramen are patent.    L4-L5: Posterior pedicle screw and liliana fusion on the right with interbody fusion and left-sided facetectomy and left-sided hemilaminectomy changes. Spinal canal and foramen are patent. No change from previous examination.    L5-S1: Pedicle screw and liliana fusion on the right with interbody fusion and decompressive laminectomy changes. Grade 1 anterior spondylolisthesis of L5 on S1 as seen previously. Within the limitations of the metallic artifact the spinal canal and foramen   are patent.      Impression    IMPRESSION:  1.  Postsurgical change at L4-5 and L5-S1. No change when compared to previous MRI scan from 2018. Spinal canal is patent at these levels.    2.  Mild DDD change at L1-2, L2-3 and L3-4 as outlined above.       Discharge Medications   Current Discharge Medication List      START taking these medications    Details   diazepam (VALIUM) 10 MG tablet Take 1 tablet (10 mg) by mouth every 6 hours as needed for muscle spasms  Qty: 20 tablet, Refills: 0    Associated Diagnoses: Lumbar radiculopathy; Acute bilateral low back pain with left-sided sciatica      oxyCODONE (ROXICODONE) 10 MG tablet Take 0.5-1 tablets (5-10 mg) by mouth every 6 hours as needed for moderate to severe pain  Qty: 15 tablet, Refills: 0    Associated Diagnoses: Lumbar radiculopathy; Acute bilateral low back pain with left-sided sciatica      senna-docusate (SENOKOT-S/PERICOLACE) 8.6-50 MG tablet Take 1 tablet by mouth 2 times daily as needed for constipation  Qty: 20 tablet, Refills: 0    Associated Diagnoses: Constipation, unspecified constipation type      tiZANidine (ZANAFLEX) 2 MG tablet Take 1 tablet (2 mg) by mouth 3 times daily as needed for muscle spasms  Qty: 20 tablet, Refills: 0    Associated Diagnoses:  Lumbar radiculopathy; Acute bilateral low back pain with left-sided sciatica         CONTINUE these medications which have CHANGED    Details   methylPREDNISolone (MEDROL DOSEPAK) 4 MG tablet therapy pack Follow Package Directions  Qty: 21 tablet, Refills: 0    Associated Diagnoses: Lumbar radiculopathy; Acute bilateral low back pain with left-sided sciatica         CONTINUE these medications which have NOT CHANGED    Details   acetaminophen (TYLENOL) 325 MG tablet Take 650 mg by mouth every 4 hours as needed       ibuprofen (ADVIL/MOTRIN) 200 MG tablet Take 400-800 mg by mouth every 6 hours as needed for pain      omeprazole (PRILOSEC) 20 MG DR capsule Take 20 mg by mouth daily as needed           Allergies   No Known Allergies

## 2021-06-23 NOTE — PLAN OF CARE
PRIMARY DIAGNOSIS: ACUTE PAIN  OUTPATIENT/OBSERVATION GOALS TO BE MET BEFORE DISCHARGE:  1. Pain Status: Improved-controlled with oral pain medications.    2. Return to near baseline physical activity: No    3. Cleared for discharge by consultants (if involved): No    Discharge Planner Nurse   Safe discharge environment identified: Yes  Barriers to discharge: Yes       Entered by: Amelia Emmanuel 06/23/2021 12:44 AM     Pt c/o of pain rating at 6/7. Oxycodone given. Pt resting soundly afterwards. VSS. .Vital signs:  Temp: 95.4  F (35.2  C) Temp src: Oral BP: 110/50 Pulse: 77   Resp: 16 SpO2: 96 % O2 Device: None (Room air)     IV is SL. Pt has not gotten up out of bed to ambulate for this shift. Pt NPO after midnight incase surgery is needed.        Please review provider order for any additional goals.   Nurse to notify provider when observation goals have been met and patient is ready for discharge.

## 2021-06-23 NOTE — PLAN OF CARE
PRIMARY DIAGNOSIS: ACUTE PAIN  OUTPATIENT/OBSERVATION GOALS TO BE MET BEFORE DISCHARGE:  1. Pain Status: Improved-controlled with oral pain medications.    2. Return to near baseline physical activity: Unsure patient has not been up.     3. Cleared for discharge by consultants (if involved): No     Discharge Planner Nurse   Safe discharge environment identified: Yes  Barriers to discharge: Yes       Entered by: China Otoole 06/23/2021 10:46 AM  Pt alert and oriented. VSS. Tolerating PO meds and diet. Pt sitting up in bed. Has not been up since admission. Plan: Increased valium, PO steroids, Ambulate.   Please review provider order for any additional goals.   Nurse to notify provider when observation goals have been met and patient is ready for discharge.

## 2021-09-19 ENCOUNTER — HEALTH MAINTENANCE LETTER (OUTPATIENT)
Age: 46
End: 2021-09-19

## 2022-02-17 PROBLEM — K21.9 GASTROESOPHAGEAL REFLUX DISEASE: Status: ACTIVE | Noted: 2017-07-25

## 2022-05-01 ENCOUNTER — HEALTH MAINTENANCE LETTER (OUTPATIENT)
Age: 47
End: 2022-05-01

## 2022-11-21 ENCOUNTER — HEALTH MAINTENANCE LETTER (OUTPATIENT)
Age: 47
End: 2022-11-21

## 2023-06-02 ENCOUNTER — HEALTH MAINTENANCE LETTER (OUTPATIENT)
Age: 48
End: 2023-06-02

## 2024-01-31 ENCOUNTER — APPOINTMENT (OUTPATIENT)
Dept: CT IMAGING | Facility: CLINIC | Age: 49
End: 2024-01-31
Attending: EMERGENCY MEDICINE
Payer: COMMERCIAL

## 2024-01-31 ENCOUNTER — HOSPITAL ENCOUNTER (OUTPATIENT)
Facility: CLINIC | Age: 49
Setting detail: OBSERVATION
Discharge: HOME | End: 2024-02-01
Attending: EMERGENCY MEDICINE | Admitting: HOSPITALIST
Payer: COMMERCIAL

## 2024-01-31 ENCOUNTER — APPOINTMENT (OUTPATIENT)
Dept: ULTRASOUND IMAGING | Facility: CLINIC | Age: 49
End: 2024-01-31
Attending: EMERGENCY MEDICINE
Payer: COMMERCIAL

## 2024-01-31 DIAGNOSIS — D64.9 SYMPTOMATIC ANEMIA: ICD-10-CM

## 2024-01-31 LAB
ABO/RH(D): NORMAL
ALBUMIN SERPL BCG-MCNC: 4.8 G/DL (ref 3.5–5.2)
ALP SERPL-CCNC: 61 U/L (ref 40–150)
ALT SERPL W P-5'-P-CCNC: 31 U/L (ref 0–70)
ANION GAP SERPL CALCULATED.3IONS-SCNC: 10 MMOL/L (ref 7–15)
ANTIBODY SCREEN: NEGATIVE
AST SERPL W P-5'-P-CCNC: 40 U/L (ref 0–45)
BASOPHILS # BLD AUTO: 0 10E3/UL (ref 0–0.2)
BASOPHILS NFR BLD AUTO: 1 %
BILIRUB SERPL-MCNC: 0.4 MG/DL
BLD PROD TYP BPU: NORMAL
BLOOD COMPONENT TYPE: NORMAL
BUN SERPL-MCNC: 11.2 MG/DL (ref 6–20)
CALCIUM SERPL-MCNC: 9.1 MG/DL (ref 8.6–10)
CHLORIDE SERPL-SCNC: 103 MMOL/L (ref 98–107)
CODING SYSTEM: NORMAL
CREAT SERPL-MCNC: 0.77 MG/DL (ref 0.67–1.17)
CROSSMATCH: NORMAL
DEPRECATED HCO3 PLAS-SCNC: 25 MMOL/L (ref 22–29)
EGFRCR SERPLBLD CKD-EPI 2021: >90 ML/MIN/1.73M2
EOSINOPHIL # BLD AUTO: 0 10E3/UL (ref 0–0.7)
EOSINOPHIL NFR BLD AUTO: 1 %
ERYTHROCYTE [DISTWIDTH] IN BLOOD BY AUTOMATED COUNT: 16.4 % (ref 10–15)
GLUCOSE SERPL-MCNC: 102 MG/DL (ref 70–99)
HCT VFR BLD AUTO: 20.9 % (ref 40–53)
HEMOCCULT STL QL: NEGATIVE
HGB BLD-MCNC: 6.2 G/DL (ref 13.3–17.7)
IMM GRANULOCYTES # BLD: 0 10E3/UL
IMM GRANULOCYTES NFR BLD: 0 %
INR PPP: 1.2 (ref 0.85–1.15)
IRON BINDING CAPACITY (ROCHE): 441 UG/DL (ref 240–430)
IRON SATN MFR SERPL: 3 % (ref 15–46)
IRON SERPL-MCNC: 12 UG/DL (ref 61–157)
ISSUE DATE AND TIME: NORMAL
LYMPHOCYTES # BLD AUTO: 1 10E3/UL (ref 0.8–5.3)
LYMPHOCYTES NFR BLD AUTO: 24 %
MCH RBC QN AUTO: 23 PG (ref 26.5–33)
MCHC RBC AUTO-ENTMCNC: 29.7 G/DL (ref 31.5–36.5)
MCV RBC AUTO: 77 FL (ref 78–100)
MONOCYTES # BLD AUTO: 0.3 10E3/UL (ref 0–1.3)
MONOCYTES NFR BLD AUTO: 7 %
NEUTROPHILS # BLD AUTO: 2.9 10E3/UL (ref 1.6–8.3)
NEUTROPHILS NFR BLD AUTO: 67 %
NRBC # BLD AUTO: 0 10E3/UL
NRBC BLD AUTO-RTO: 0 /100
PLATELET # BLD AUTO: 259 10E3/UL (ref 150–450)
POTASSIUM SERPL-SCNC: 3.8 MMOL/L (ref 3.4–5.3)
PROT SERPL-MCNC: 7.4 G/DL (ref 6.4–8.3)
RBC # BLD AUTO: 2.7 10E6/UL (ref 4.4–5.9)
SODIUM SERPL-SCNC: 138 MMOL/L (ref 135–145)
SPECIMEN EXPIRATION DATE: NORMAL
UNIT ABO/RH: NORMAL
UNIT NUMBER: NORMAL
UNIT STATUS: NORMAL
UNIT TYPE ISBT: 5100
WBC # BLD AUTO: 4.3 10E3/UL (ref 4–11)

## 2024-01-31 PROCEDURE — 250N000011 HC RX IP 250 OP 636: Performed by: EMERGENCY MEDICINE

## 2024-01-31 PROCEDURE — 71275 CT ANGIOGRAPHY CHEST: CPT

## 2024-01-31 PROCEDURE — 82272 OCCULT BLD FECES 1-3 TESTS: CPT | Performed by: EMERGENCY MEDICINE

## 2024-01-31 PROCEDURE — 85025 COMPLETE CBC W/AUTO DIFF WBC: CPT | Performed by: EMERGENCY MEDICINE

## 2024-01-31 PROCEDURE — 82728 ASSAY OF FERRITIN: CPT | Performed by: NURSE PRACTITIONER

## 2024-01-31 PROCEDURE — 93005 ELECTROCARDIOGRAM TRACING: CPT

## 2024-01-31 PROCEDURE — 82040 ASSAY OF SERUM ALBUMIN: CPT | Performed by: EMERGENCY MEDICINE

## 2024-01-31 PROCEDURE — 36430 TRANSFUSION BLD/BLD COMPNT: CPT

## 2024-01-31 PROCEDURE — P9016 RBC LEUKOCYTES REDUCED: HCPCS | Performed by: EMERGENCY MEDICINE

## 2024-01-31 PROCEDURE — 99223 1ST HOSP IP/OBS HIGH 75: CPT | Performed by: NURSE PRACTITIONER

## 2024-01-31 PROCEDURE — 86923 COMPATIBILITY TEST ELECTRIC: CPT | Performed by: INTERNAL MEDICINE

## 2024-01-31 PROCEDURE — 82607 VITAMIN B-12: CPT | Performed by: NURSE PRACTITIONER

## 2024-01-31 PROCEDURE — 93971 EXTREMITY STUDY: CPT | Mod: LT

## 2024-01-31 PROCEDURE — 83550 IRON BINDING TEST: CPT | Performed by: NURSE PRACTITIONER

## 2024-01-31 PROCEDURE — 99285 EMERGENCY DEPT VISIT HI MDM: CPT | Mod: 25

## 2024-01-31 PROCEDURE — 85610 PROTHROMBIN TIME: CPT | Performed by: EMERGENCY MEDICINE

## 2024-01-31 PROCEDURE — G0378 HOSPITAL OBSERVATION PER HR: HCPCS

## 2024-01-31 PROCEDURE — 86923 COMPATIBILITY TEST ELECTRIC: CPT | Performed by: EMERGENCY MEDICINE

## 2024-01-31 PROCEDURE — 99418 PROLNG IP/OBS E/M EA 15 MIN: CPT | Performed by: NURSE PRACTITIONER

## 2024-01-31 PROCEDURE — 96361 HYDRATE IV INFUSION ADD-ON: CPT

## 2024-01-31 PROCEDURE — 258N000003 HC RX IP 258 OP 636: Performed by: NURSE PRACTITIONER

## 2024-01-31 PROCEDURE — 86900 BLOOD TYPING SEROLOGIC ABO: CPT | Performed by: EMERGENCY MEDICINE

## 2024-01-31 PROCEDURE — 36415 COLL VENOUS BLD VENIPUNCTURE: CPT | Performed by: EMERGENCY MEDICINE

## 2024-01-31 PROCEDURE — 250N000009 HC RX 250: Performed by: EMERGENCY MEDICINE

## 2024-01-31 RX ORDER — PROCHLORPERAZINE 25 MG
25 SUPPOSITORY, RECTAL RECTAL EVERY 12 HOURS PRN
Status: DISCONTINUED | OUTPATIENT
Start: 2024-01-31 | End: 2024-02-01 | Stop reason: HOSPADM

## 2024-01-31 RX ORDER — OLOPATADINE HYDROCHLORIDE 1 MG/ML
1 SOLUTION/ DROPS OPHTHALMIC 2 TIMES DAILY PRN
COMMUNITY

## 2024-01-31 RX ORDER — ACETAMINOPHEN 325 MG/1
650 TABLET ORAL EVERY 4 HOURS PRN
Status: DISCONTINUED | OUTPATIENT
Start: 2024-01-31 | End: 2024-02-01 | Stop reason: HOSPADM

## 2024-01-31 RX ORDER — ONDANSETRON 4 MG/1
4 TABLET, ORALLY DISINTEGRATING ORAL EVERY 6 HOURS PRN
Status: DISCONTINUED | OUTPATIENT
Start: 2024-01-31 | End: 2024-02-01 | Stop reason: HOSPADM

## 2024-01-31 RX ORDER — METHOCARBAMOL 500 MG/1
500 TABLET, FILM COATED ORAL 4 TIMES DAILY PRN
Status: DISCONTINUED | OUTPATIENT
Start: 2024-01-31 | End: 2024-02-01 | Stop reason: HOSPADM

## 2024-01-31 RX ORDER — DIPHENHYDRAMINE HYDROCHLORIDE 50 MG/ML
50 INJECTION INTRAMUSCULAR; INTRAVENOUS
Status: DISCONTINUED | OUTPATIENT
Start: 2024-01-31 | End: 2024-02-01 | Stop reason: HOSPADM

## 2024-01-31 RX ORDER — LIDOCAINE 40 MG/G
CREAM TOPICAL
Status: DISCONTINUED | OUTPATIENT
Start: 2024-01-31 | End: 2024-02-01 | Stop reason: HOSPADM

## 2024-01-31 RX ORDER — PROCHLORPERAZINE MALEATE 10 MG
10 TABLET ORAL EVERY 6 HOURS PRN
Status: DISCONTINUED | OUTPATIENT
Start: 2024-01-31 | End: 2024-02-01 | Stop reason: HOSPADM

## 2024-01-31 RX ORDER — ONDANSETRON 2 MG/ML
4 INJECTION INTRAMUSCULAR; INTRAVENOUS EVERY 6 HOURS PRN
Status: DISCONTINUED | OUTPATIENT
Start: 2024-01-31 | End: 2024-02-01 | Stop reason: HOSPADM

## 2024-01-31 RX ORDER — AMOXICILLIN 250 MG
1 CAPSULE ORAL 2 TIMES DAILY PRN
Status: DISCONTINUED | OUTPATIENT
Start: 2024-01-31 | End: 2024-02-01 | Stop reason: HOSPADM

## 2024-01-31 RX ORDER — AMOXICILLIN 250 MG
2 CAPSULE ORAL 2 TIMES DAILY PRN
Status: DISCONTINUED | OUTPATIENT
Start: 2024-01-31 | End: 2024-02-01 | Stop reason: HOSPADM

## 2024-01-31 RX ORDER — METHYLPREDNISOLONE SODIUM SUCCINATE 125 MG/2ML
125 INJECTION, POWDER, LYOPHILIZED, FOR SOLUTION INTRAMUSCULAR; INTRAVENOUS
Status: DISCONTINUED | OUTPATIENT
Start: 2024-01-31 | End: 2024-02-01 | Stop reason: HOSPADM

## 2024-01-31 RX ORDER — IOPAMIDOL 755 MG/ML
120 INJECTION, SOLUTION INTRAVASCULAR ONCE
Status: COMPLETED | OUTPATIENT
Start: 2024-01-31 | End: 2024-01-31

## 2024-01-31 RX ORDER — SODIUM CHLORIDE 9 MG/ML
INJECTION, SOLUTION INTRAVENOUS CONTINUOUS
Status: DISCONTINUED | OUTPATIENT
Start: 2024-01-31 | End: 2024-02-01 | Stop reason: HOSPADM

## 2024-01-31 RX ORDER — ACETAMINOPHEN 650 MG/1
650 SUPPOSITORY RECTAL EVERY 4 HOURS PRN
Status: DISCONTINUED | OUTPATIENT
Start: 2024-01-31 | End: 2024-02-01 | Stop reason: HOSPADM

## 2024-01-31 RX ADMIN — IOPAMIDOL 75 ML: 755 INJECTION, SOLUTION INTRAVENOUS at 19:49

## 2024-01-31 RX ADMIN — SODIUM CHLORIDE: 9 INJECTION, SOLUTION INTRAVENOUS at 22:14

## 2024-01-31 RX ADMIN — SODIUM CHLORIDE 65 ML: 9 INJECTION, SOLUTION INTRAVENOUS at 19:49

## 2024-01-31 ASSESSMENT — ACTIVITIES OF DAILY LIVING (ADL)
ADLS_ACUITY_SCORE: 35

## 2024-01-31 NOTE — ED TRIAGE NOTES
Pt c/o pain in left lower leg, has a hx of DVT a year ago. Went to clinic for an evaluation and lab work was done. Pt found to have a hemoglobin of 6.2 also elevated Ddimer at 1.03. Pt sent to ED for an evaluation.      Triage Assessment (Adult)       Row Name 01/31/24 1431          Triage Assessment    Airway WDL WDL        Respiratory WDL    Respiratory WDL WDL        Skin Circulation/Temperature WDL    Skin Circulation/Temperature WDL WDL        Cardiac WDL    Cardiac WDL WDL        Peripheral/Neurovascular WDL    Peripheral Neurovascular WDL WDL        Cognitive/Neuro/Behavioral WDL    Cognitive/Neuro/Behavioral WDL WDL

## 2024-01-31 NOTE — ED PROVIDER NOTES
History     Chief Complaint:  Abnormal Labs       HPI   Yordy Presley is a 48 year old male with history of DVT presents emergency department with the complaint of shortness of breath and low hemoglobin.  Patient reports that he has been short of breath since December.  He states that it is increasing in severity.  He mostly notices it when he is exerting himself.  Patient reports that he does have a history of a DVT, he was on blood thinners for 2 months and has not been on them since.  He does not know why he had a blood clot.  Patient also complains of left calf pain.  He states that started a couple nights ago.  Patient is not having any chest pain.      Independent Historian:   None - Patient Only    Review of External Notes:   Reviewed patient's office visit with internal medicine for pain of left calf and hemorrhoids.  Patient's D-dimer was elevated at 1.03.  Patient's hemoglobin is found to be 6.2.      Medications:    Prilosec   Flexeril     Past Medical History:    Hemorrhoids   GERD  Lumbar radiculopathy   Sciatica   DVT   Chronic airway obstruction     Past Surgical History:    Lumbar laminectomy   Tonsillectomy   Wrist surgery        Physical Exam   Patient Vitals for the past 24 hrs:   BP Temp Temp src Pulse Resp SpO2 Height Weight   01/31/24 1915 110/71 -- -- 90 -- 99 % -- --   01/31/24 1900 111/74 -- -- 90 -- 99 % -- --   01/31/24 1845 115/79 -- -- 93 -- 99 % -- --   01/31/24 1830 104/69 -- -- 93 -- 99 % -- --   01/31/24 1819 106/70 98.9  F (37.2  C) Oral 97 16 98 % -- --   01/31/24 1804 109/68 99  F (37.2  C) -- 94 16 -- -- --   01/31/24 1722 -- -- -- -- -- 99 % -- --   01/31/24 1712 -- -- -- -- -- 99 % -- --   01/31/24 1649 -- -- -- -- -- 100 % -- --   01/31/24 1645 122/68 -- -- 102 -- 100 % -- --   01/31/24 1630 124/71 -- -- 102 -- 99 % -- --   01/31/24 1629 124/71 -- -- 102 -- 99 % -- --   01/31/24 1623 122/80 -- -- -- -- 99 % -- --   01/31/24 1430 124/81 98.7  F (37.1  C) Temporal 79 20 100  "% 1.905 m (6' 3\") 88.5 kg (195 lb)        Physical Exam  General: Well-nourished, resting comfortably when I enter the room  Eyes: Pupils equal, conjunctivae pink no scleral icterus or conjunctival injection  ENT:  Moist mucus membranes  Respiratory:  Lungs clear to auscultation bilaterally, no crackles/rubs/wheezes.  Good air movement  CV: Normal rate and rhythm, no murmurs  GI:  Abdomen soft and non-distended.  No tenderness, guarding or rebound  Skin: Warm, dry.  No rashes or petechiae  Musculoskeletal: No peripheral edema.  Tenderness to palpation of the left calf.  No tenderness of the right calf.  Neuro: Alert and oriented to person/place/time  Psychiatric: Normal affect  Rectal: Nonbleeding external hemorrhoids.  Stool is light brown.  No gross blood.  No melena.      Emergency Department Course     ECG results from 01/31/24   EKG 12 lead     Value    Systolic Blood Pressure     Diastolic Blood Pressure     Ventricular Rate 92    Atrial Rate 92    VT Interval 154    QRS Duration 92        QTc 437    P Axis 86    R AXIS 73    T Axis 53    Interpretation ECG      Sinus rhythm  Normal ECG  No previous ECGs available           Imaging:  CT Chest Pulmonary Embolism w Contrast   Final Result   IMPRESSION:   1.  No acute pulmonary embolism, focal consolidation, pleural effusion.   2.  Few tiny pulmonary nodules. Per Fleischner Society guidelines, if the patient is at low risk for pulmonary malignancy, no dedicated imaging follow-up is required. If the patient is at high risk for pulmonary malignancy, consider optional follow up CT    chest in 12 months.      US Lower Extremity Venous Duplex Left   Final Result   IMPRESSION:   1.  No deep venous thrombosis in the left lower extremity.             Laboratory:  Labs Ordered and Resulted from Time of ED Arrival to Time of ED Departure   COMPREHENSIVE METABOLIC PANEL - Abnormal       Result Value    Sodium 138      Potassium 3.8      Carbon Dioxide (CO2) 25      " Anion Gap 10      Urea Nitrogen 11.2      Creatinine 0.77      GFR Estimate >90      Calcium 9.1      Chloride 103      Glucose 102 (*)     Alkaline Phosphatase 61      AST 40      ALT 31      Protein Total 7.4      Albumin 4.8      Bilirubin Total 0.4     INR - Abnormal    INR 1.20 (*)    CBC WITH PLATELETS AND DIFFERENTIAL - Abnormal    WBC Count 4.3      RBC Count 2.70 (*)     Hemoglobin 6.2 (*)     Hematocrit 20.9 (*)     MCV 77 (*)     MCH 23.0 (*)     MCHC 29.7 (*)     RDW 16.4 (*)     Platelet Count 259      % Neutrophils 67      % Lymphocytes 24      % Monocytes 7      % Eosinophils 1      % Basophils 1      % Immature Granulocytes 0      NRBCs per 100 WBC 0      Absolute Neutrophils 2.9      Absolute Lymphocytes 1.0      Absolute Monocytes 0.3      Absolute Eosinophils 0.0      Absolute Basophils 0.0      Absolute Immature Granulocytes 0.0      Absolute NRBCs 0.0     OCCULT BLOOD STOOL - Normal    Occult Blood Negative     TYPE AND SCREEN, ADULT    ABO/RH(D) O POS      Antibody Screen Negative      SPECIMEN EXPIRATION DATE 23962199978398     PREPARE RED BLOOD CELLS (UNIT)    Blood Component Type Red Blood Cells      Product Code L6436Y26      Unit Status Transfused      Unit Number [id number]      CROSSMATCH Compatible      CODING SYSTEM [id number]      ISSUE DATE AND TIME 38292560197970      UNIT ABO/RH O+      UNIT TYPE ISBT 5100     PREPARE RED BLOOD CELLS (UNIT)   TRANSFUSE RED BLOOD CELLS (UNIT)   ABO/RH TYPE AND SCREEN        Procedures       Emergency Department Course & Assessments:    Interventions:  Medications   iopamidol (ISOVUE-370) solution 120 mL (75 mLs Intravenous $Given 1/31/24 1949)   Saline CT scan flush (65 mLs Intravenous $Given 1/31/24 1949)        Assessments:  On reevaluation, patient is getting his blood transfusion.  He is resting comfortably.    Independent Interpretation (X-rays, CTs, rhythm strip):  None    Consultations/Discussion of Management or Tests:  Spoke with Casey  Mejia with hospitalist team.  Patient will be admitted to Dr. Jasso for observation.       Social Determinants of Health affecting care:   None    Disposition:  The patient was admitted to the hospital under the care of Dr. Jasso.     Impression & Plan    CMS Diagnoses: None      Medical Decision Makin-year-old male presents emergency department with a complaint of low hemoglobin level and shortness of breath.  Patient was called by his primary care physician's office after having routine labs done.  His hemoglobin is 6.2 and his D-dimer is elevated.  He was recommended to go to the emergency department.  On evaluation patient is not in any distress.  He is not tachypneic.  He is not having any shortness of breath or chest pain at rest.  He does have some tenderness to palpation of the left calf.  Patient's hemoglobin here on reevaluation is 6.2.  Patient reports that he is not having any dark or black stools.  He does have hemorrhoids from being constipated and once in a while he sees some bright red blood on the outside of his stool.  He has not had that today.  Patient's ultrasound of his lower extremities did not show a DVT.  CT PE scan did not show a pulmonary embolism.  Rectal exam shows external hemorrhoids, nonbleeding.  Stool was light brown.  Hemoccult was negative.  I have low suspicion for GI bleed.  His anemia may be secondary to iron deficiency.  Although he does have hemorrhoids, his last hemoglobin was 11.  It would seem like a big drop for his hemorrhoids to cause a drop of 5 points in hemoglobin.  Patient is getting 1 unit of blood transfused.  Patient is admitted to Dr. Jasso.        Diagnosis:    ICD-10-CM    1. Symptomatic anemia  D64.9            Discharge Medications:  New Prescriptions    No medications on file          Scribe Disclosure:  I, Sweta Barrios, am serving as a scribe at 4:59 PM on 2024 to document services personally performed by Karly Ramírez MD based on my  observations and the provider's statements to me.   1/31/2024   Karly Ramírez MD Richardson, Elizabeth, MD  01/31/24 5033

## 2024-02-01 VITALS
BODY MASS INDEX: 23.74 KG/M2 | OXYGEN SATURATION: 97 % | WEIGHT: 190.9 LBS | RESPIRATION RATE: 16 BRPM | TEMPERATURE: 98.6 F | SYSTOLIC BLOOD PRESSURE: 105 MMHG | HEIGHT: 75 IN | HEART RATE: 77 BPM | DIASTOLIC BLOOD PRESSURE: 73 MMHG

## 2024-02-01 LAB
ALBUMIN SERPL BCG-MCNC: 4 G/DL (ref 3.5–5.2)
ALP SERPL-CCNC: 55 U/L (ref 40–150)
ALT SERPL W P-5'-P-CCNC: 30 U/L (ref 0–70)
ANION GAP SERPL CALCULATED.3IONS-SCNC: 8 MMOL/L (ref 7–15)
AST SERPL W P-5'-P-CCNC: 38 U/L (ref 0–45)
ATRIAL RATE - MUSE: 92 BPM
BASOPHILS # BLD AUTO: 0 10E3/UL (ref 0–0.2)
BASOPHILS NFR BLD AUTO: 1 %
BILIRUB SERPL-MCNC: 0.4 MG/DL
BLD PROD TYP BPU: NORMAL
BLOOD COMPONENT TYPE: NORMAL
BUN SERPL-MCNC: 10.7 MG/DL (ref 6–20)
CALCIUM SERPL-MCNC: 8.5 MG/DL (ref 8.6–10)
CHLORIDE SERPL-SCNC: 106 MMOL/L (ref 98–107)
CODING SYSTEM: NORMAL
CREAT SERPL-MCNC: 0.78 MG/DL (ref 0.67–1.17)
CROSSMATCH: NORMAL
DEPRECATED HCO3 PLAS-SCNC: 22 MMOL/L (ref 22–29)
DIASTOLIC BLOOD PRESSURE - MUSE: NORMAL MMHG
EGFRCR SERPLBLD CKD-EPI 2021: >90 ML/MIN/1.73M2
EOSINOPHIL # BLD AUTO: 0.1 10E3/UL (ref 0–0.7)
EOSINOPHIL NFR BLD AUTO: 1 %
ERYTHROCYTE [DISTWIDTH] IN BLOOD BY AUTOMATED COUNT: 16.1 % (ref 10–15)
ERYTHROCYTE [DISTWIDTH] IN BLOOD BY AUTOMATED COUNT: 16.3 % (ref 10–15)
FERRITIN SERPL-MCNC: 5 NG/ML (ref 31–409)
FOLATE SERPL-MCNC: 8.2 NG/ML (ref 4.6–34.8)
GLUCOSE BLDC GLUCOMTR-MCNC: 102 MG/DL (ref 70–99)
GLUCOSE SERPL-MCNC: 101 MG/DL (ref 70–99)
HCT VFR BLD AUTO: 22.5 % (ref 40–53)
HCT VFR BLD AUTO: 24.6 % (ref 40–53)
HGB BLD-MCNC: 6.9 G/DL (ref 13.3–17.7)
HGB BLD-MCNC: 7.7 G/DL (ref 13.3–17.7)
HGB BLD-MCNC: 7.8 G/DL (ref 13.3–17.7)
IMM GRANULOCYTES # BLD: 0 10E3/UL
IMM GRANULOCYTES NFR BLD: 0 %
INTERPRETATION ECG - MUSE: NORMAL
ISSUE DATE AND TIME: NORMAL
LYMPHOCYTES # BLD AUTO: 1.2 10E3/UL (ref 0.8–5.3)
LYMPHOCYTES NFR BLD AUTO: 25 %
MCH RBC QN AUTO: 24.3 PG (ref 26.5–33)
MCH RBC QN AUTO: 24.8 PG (ref 26.5–33)
MCHC RBC AUTO-ENTMCNC: 30.7 G/DL (ref 31.5–36.5)
MCHC RBC AUTO-ENTMCNC: 31.3 G/DL (ref 31.5–36.5)
MCV RBC AUTO: 79 FL (ref 78–100)
MCV RBC AUTO: 79 FL (ref 78–100)
MONOCYTES # BLD AUTO: 0.5 10E3/UL (ref 0–1.3)
MONOCYTES NFR BLD AUTO: 11 %
NEUTROPHILS # BLD AUTO: 3.1 10E3/UL (ref 1.6–8.3)
NEUTROPHILS NFR BLD AUTO: 63 %
NRBC # BLD AUTO: 0 10E3/UL
NRBC BLD AUTO-RTO: 0 /100
P AXIS - MUSE: 86 DEGREES
PATH REPORT.COMMENTS IMP SPEC: NORMAL
PATH REPORT.COMMENTS IMP SPEC: NORMAL
PATH REPORT.FINAL DX SPEC: NORMAL
PATH REPORT.MICROSCOPIC SPEC OTHER STN: NORMAL
PATH REPORT.MICROSCOPIC SPEC OTHER STN: NORMAL
PATH REPORT.RELEVANT HX SPEC: NORMAL
PLATELET # BLD AUTO: 230 10E3/UL (ref 150–450)
PLATELET # BLD AUTO: 249 10E3/UL (ref 150–450)
POTASSIUM SERPL-SCNC: 4.2 MMOL/L (ref 3.4–5.3)
PR INTERVAL - MUSE: 154 MS
PROT SERPL-MCNC: 6.6 G/DL (ref 6.4–8.3)
QRS DURATION - MUSE: 92 MS
QT - MUSE: 354 MS
QTC - MUSE: 437 MS
R AXIS - MUSE: 73 DEGREES
RBC # BLD AUTO: 2.84 10E6/UL (ref 4.4–5.9)
RBC # BLD AUTO: 3.1 10E6/UL (ref 4.4–5.9)
RETICS # AUTO: 0.05 10E6/UL (ref 0.03–0.1)
RETICS/RBC NFR AUTO: 1.8 % (ref 0.5–2)
SODIUM SERPL-SCNC: 136 MMOL/L (ref 135–145)
SYSTOLIC BLOOD PRESSURE - MUSE: NORMAL MMHG
T AXIS - MUSE: 53 DEGREES
UNIT ABO/RH: NORMAL
UNIT NUMBER: NORMAL
UNIT STATUS: NORMAL
UNIT TYPE ISBT: 5100
VENTRICULAR RATE- MUSE: 92 BPM
VIT B12 SERPL-MCNC: 466 PG/ML (ref 232–1245)
WBC # BLD AUTO: 4.9 10E3/UL (ref 4–11)
WBC # BLD AUTO: 5.9 10E3/UL (ref 4–11)

## 2024-02-01 PROCEDURE — 99238 HOSP IP/OBS DSCHRG MGMT 30/<: CPT | Performed by: INTERNAL MEDICINE

## 2024-02-01 PROCEDURE — 250N000013 HC RX MED GY IP 250 OP 250 PS 637: Performed by: NURSE PRACTITIONER

## 2024-02-01 PROCEDURE — 258N000003 HC RX IP 258 OP 636: Performed by: NURSE PRACTITIONER

## 2024-02-01 PROCEDURE — 85018 HEMOGLOBIN: CPT | Performed by: INTERNAL MEDICINE

## 2024-02-01 PROCEDURE — 250N000011 HC RX IP 250 OP 636: Performed by: INTERNAL MEDICINE

## 2024-02-01 PROCEDURE — 82962 GLUCOSE BLOOD TEST: CPT

## 2024-02-01 PROCEDURE — 250N000011 HC RX IP 250 OP 636: Performed by: NURSE PRACTITIONER

## 2024-02-01 PROCEDURE — 82040 ASSAY OF SERUM ALBUMIN: CPT | Performed by: NURSE PRACTITIONER

## 2024-02-01 PROCEDURE — 96375 TX/PRO/DX INJ NEW DRUG ADDON: CPT

## 2024-02-01 PROCEDURE — 99207 BLOOD MORPHOLOGY PATHOLOGIST REVIEW: CPT | Performed by: PATHOLOGY

## 2024-02-01 PROCEDURE — 85027 COMPLETE CBC AUTOMATED: CPT | Mod: 91 | Performed by: NURSE PRACTITIONER

## 2024-02-01 PROCEDURE — 85045 AUTOMATED RETICULOCYTE COUNT: CPT | Performed by: NURSE PRACTITIONER

## 2024-02-01 PROCEDURE — 36430 TRANSFUSION BLD/BLD COMPNT: CPT

## 2024-02-01 PROCEDURE — 36415 COLL VENOUS BLD VENIPUNCTURE: CPT | Performed by: INTERNAL MEDICINE

## 2024-02-01 PROCEDURE — 96374 THER/PROPH/DIAG INJ IV PUSH: CPT

## 2024-02-01 PROCEDURE — P9016 RBC LEUKOCYTES REDUCED: HCPCS | Performed by: INTERNAL MEDICINE

## 2024-02-01 PROCEDURE — 82746 ASSAY OF FOLIC ACID SERUM: CPT | Performed by: NURSE PRACTITIONER

## 2024-02-01 PROCEDURE — 85025 COMPLETE CBC W/AUTO DIFF WBC: CPT | Performed by: NURSE PRACTITIONER

## 2024-02-01 PROCEDURE — G0378 HOSPITAL OBSERVATION PER HR: HCPCS

## 2024-02-01 PROCEDURE — C9113 INJ PANTOPRAZOLE SODIUM, VIA: HCPCS | Performed by: INTERNAL MEDICINE

## 2024-02-01 PROCEDURE — 36415 COLL VENOUS BLD VENIPUNCTURE: CPT | Performed by: NURSE PRACTITIONER

## 2024-02-01 RX ADMIN — PANTOPRAZOLE SODIUM 40 MG: 40 INJECTION, POWDER, FOR SOLUTION INTRAVENOUS at 09:32

## 2024-02-01 RX ADMIN — SODIUM CHLORIDE: 9 INJECTION, SOLUTION INTRAVENOUS at 13:39

## 2024-02-01 RX ADMIN — IRON SUCROSE 300 MG: 20 INJECTION, SOLUTION INTRAVENOUS at 00:32

## 2024-02-01 RX ADMIN — Medication 5 MG: at 00:38

## 2024-02-01 ASSESSMENT — ACTIVITIES OF DAILY LIVING (ADL)
ADLS_ACUITY_SCORE: 18

## 2024-02-01 NOTE — ED NOTES
[facility]  ED Nurse Handoff Report    ED Chief complaint: Abnormal Labs  . ED Diagnosis:   Final diagnoses:   Symptomatic anemia       Allergies:   Allergies   Allergen Reactions    Oxycodone GI Disturbance and Nausea and Vomiting       Code Status: Full Code    Activity level - Baseline/Home:  independent.  Activity Level - Current:   standby.   Lift room needed: No.   Bariatric: No   Needed: No   Isolation: No.   Infection: Not Applicable.     Respiratory status: Room air    Vital Signs (within 30 minutes):   Vitals:    01/31/24 1845 01/31/24 1900 01/31/24 1915 01/31/24 2110   BP: 115/79 111/74 110/71 107/71   Pulse: 93 90 90 88   Resp:       Temp:       TempSrc:       SpO2: 99% 99% 99% 98%   Weight:       Height:           Cardiac Rhythm:  ,      Pain level:    Patient confused: No.   Patient Falls Risk: nonskid shoes/slippers when out of bed, patient and family education, and assistive device/personal items within reach.   Elimination Status: Has voided     Patient Report - Initial Complaint: abnormal labs/SOB.   Yordy Presley is a 48 year old male with history of DVT presents emergency department with the complaint of shortness of breath and low hemoglobin.  Patient reports that he has been short of breath since December.  He states that it is increasing in severity.  He mostly notices it when he is exerting himself.  Patient reports that he does have a history of a DVT, he was on blood thinners for 2 months and has not been on them since.  He does not know why he had a blood clot.  Patient also complains of left calf pain.  He states that started a couple nights ago.  Patient is not having any chest pain.   Focused Assessment: Respiratory WDLRespiratory WDL: .WDL except (SOB, hx DVT. SOB worse on exertion, sent from clinic for low hgb.)      Abnormal Results:   Labs Ordered and Resulted from Time of ED Arrival to Time of ED Departure   COMPREHENSIVE METABOLIC PANEL -  Abnormal       Result Value    Sodium 138      Potassium 3.8      Carbon Dioxide (CO2) 25      Anion Gap 10      Urea Nitrogen 11.2      Creatinine 0.77      GFR Estimate >90      Calcium 9.1      Chloride 103      Glucose 102 (*)     Alkaline Phosphatase 61      AST 40      ALT 31      Protein Total 7.4      Albumin 4.8      Bilirubin Total 0.4     INR - Abnormal    INR 1.20 (*)    CBC WITH PLATELETS AND DIFFERENTIAL - Abnormal    WBC Count 4.3      RBC Count 2.70 (*)     Hemoglobin 6.2 (*)     Hematocrit 20.9 (*)     MCV 77 (*)     MCH 23.0 (*)     MCHC 29.7 (*)     RDW 16.4 (*)     Platelet Count 259      % Neutrophils 67      % Lymphocytes 24      % Monocytes 7      % Eosinophils 1      % Basophils 1      % Immature Granulocytes 0      NRBCs per 100 WBC 0      Absolute Neutrophils 2.9      Absolute Lymphocytes 1.0      Absolute Monocytes 0.3      Absolute Eosinophils 0.0      Absolute Basophils 0.0      Absolute Immature Granulocytes 0.0      Absolute NRBCs 0.0     IRON AND IRON BINDING CAPACITY - Abnormal    Iron 12 (*)     Iron Binding Capacity 441 (*)     Iron Sat Index 3 (*)    OCCULT BLOOD STOOL - Normal    Occult Blood Negative     FERRITIN   VITAMIN B12   FOLATE   TYPE AND SCREEN, ADULT    ABO/RH(D) O POS      Antibody Screen Negative      SPECIMEN EXPIRATION DATE [id number]     PREPARE RED BLOOD CELLS (UNIT)    Blood Component Type Red Blood Cells      Product Code S9215Z86      Unit Status Transfused      Unit Number [id number]      CROSSMATCH Compatible      CODING SYSTEM [id number]      ISSUE DATE AND TIME [id number]      UNIT ABO/RH O+      UNIT TYPE ISBT 5100     PREPARE RED BLOOD CELLS (UNIT)   TRANSFUSE RED BLOOD CELLS (UNIT)   ABO/RH TYPE AND SCREEN   LAB BLOOD MORPHOLOGY PATHOLOGIST REVIEW        CT Chest Pulmonary Embolism w Contrast   Final Result   IMPRESSION:   1.  No acute pulmonary embolism, focal consolidation, pleural effusion.   2.  Few tiny pulmonary nodules. Per [place]  Society guidelines, if the patient is at low risk for pulmonary malignancy, no dedicated imaging follow-up is required. If the patient is at high risk for pulmonary malignancy, consider optional follow up CT    chest in 12 months.      US Lower Extremity Venous Duplex Left   Final Result   IMPRESSION:   1.  No deep venous thrombosis in the left lower extremity.          Treatments provided: labs, imaging, blood admin  Family Comments: daughter at bedside  OBS brochure/video discussed/provided to patient:  Yes  ED Medications:   Medications   senna-docusate (SENOKOT-S/PERICOLACE) 8.6-50 MG per tablet 1 tablet (has no administration in time range)     Or   senna-docusate (SENOKOT-S/PERICOLACE) 8.6-50 MG per tablet 2 tablet (has no administration in time range)   ondansetron (ZOFRAN ODT) ODT tab 4 mg (has no administration in time range)     Or   ondansetron (ZOFRAN) injection 4 mg (has no administration in time range)   lidocaine 1 % 0.1-1 mL (has no administration in time range)   lidocaine (LMX4) cream (has no administration in time range)   sodium chloride (PF) 0.9% PF flush 3 mL (has no administration in time range)   sodium chloride (PF) 0.9% PF flush 3 mL (has no administration in time range)   sodium chloride 0.9 % infusion (has no administration in time range)   acetaminophen (TYLENOL) tablet 650 mg (has no administration in time range)     Or   acetaminophen (TYLENOL) Suppository 650 mg (has no administration in time range)   melatonin tablet 5 mg (has no administration in time range)   prochlorperazine (COMPAZINE) injection 10 mg (has no administration in time range)     Or   prochlorperazine (COMPAZINE) tablet 10 mg (has no administration in time range)     Or   prochlorperazine (COMPAZINE) suppository 25 mg (has no administration in time range)   methocarbamol (ROBAXIN) tablet 500 mg (has no administration in time range)   iopamidol (ISOVUE-370) solution 120 mL (75 mLs Intravenous $Given 1/31/24 1949)    Saline CT scan flush (65 mLs Intravenous $Given 1/31/24 7219)       Drips infusing:  Yes  For the majority of the shift this patient was Green.   Interventions performed were n/a.    Sepsis treatment initiated: No    Cares/treatment/interventions/medications to be completed following ED care: n/a    ED Nurse Name: Karyn Stone RN  9:47 PM     RECEIVING UNIT ED HANDOFF REVIEW    Above ED Nurse Handoff Report was reviewed: Yes  Reviewed by: Lluvia Small RN on January 31, 2024 at 10:41 PM

## 2024-02-01 NOTE — PLAN OF CARE
"PRIMARY DIAGNOSIS: \"GENERIC\" NURSING  OUTPATIENT/OBSERVATION GOALS TO BE MET BEFORE DISCHARGE:  ADLs back to baseline: Yes    Activity and level of assistance: Up with standby assistance.    Pain status: Pain free.    Return to near baseline physical activity: Yes     Discharge Planner Nurse   Safe discharge environment identified: Yes  Barriers to discharge: Yes       Entered by: Lluvia Small RN 02/01/2024 4:09 AM    Pt A & O x 4. Pt Hgb recheck was 6.9, provider notified, order transfuse 1 unit of PRBC. Pt denies any lightheadedness, not active bleeding noted. Pt denies any pain in L leg, no redness or hardness noted,. Pt is SBA with activity. Discharge TBD when medically clear.     Please review provider order for any additional goals.   Nurse to notify provider when observation goals have been met and patient is ready for discharge.                        "

## 2024-02-01 NOTE — PLAN OF CARE
PRIMARY DIAGNOSIS: ABNORMAL LABS  OUTPATIENT/OBSERVATION GOALS TO BE MET BEFORE DISCHARGE:  ADLs back to baseline: Yes    Activity and level of assistance: Up with standby assistance.    Pain status: Pain free.    Return to near baseline physical activity: Yes     Discharge Planner Nurse   Safe discharge environment identified: Yes  Barriers to discharge: Yes       Entered by: Lluvia Small RN 02/01/2024 12:11 AM   Pt Hgb 6.2, 1 unit of blood transfused. Pt denies pain. Admission complete  Please review provider order for any additional goals.   Nurse to notify provider when observation goals have been met and patient is ready for discharge.

## 2024-02-01 NOTE — PHARMACY-ADMISSION MEDICATION HISTORY
Pharmacist Admission Medication History    Admission medication history is complete. The information provided in this note is only as accurate as the sources available at the time of the update.    Information Source(s): Patient and Family member via in-person    Pertinent Information: outside meds    Changes made to PTA medication list:  Added: patanol opth  Deleted: medrol dose gerardo, oxycodone, zanaflex  Changed: None    Medication Affordability:       Allergies reviewed with patient and updates made in EHR: yes    Medication History Completed By: Gold Ordonez RPH 1/31/2024 9:53 PM    Prior to Admission medications    Medication Sig Last Dose Taking? Auth Provider Long Term End Date   acetaminophen (TYLENOL) 325 MG tablet Take 650 mg by mouth every 4 hours as needed  prn Yes Reported, Patient     diazepam (VALIUM) 10 MG tablet Take 1 tablet (10 mg) by mouth every 6 hours as needed for muscle spasms prn Yes Hadley Jasso MD     ibuprofen (ADVIL/MOTRIN) 200 MG tablet Take 400-800 mg by mouth every 6 hours as needed for pain prn Yes Unknown, Entered By History     olopatadine (PATANOL) 0.1 % ophthalmic solution Place 1 drop into both eyes 2 times daily as needed for allergies prn Yes Unknown, Entered By History     omeprazole (PRILOSEC) 20 MG DR capsule Take 20 mg by mouth daily as needed prn Yes Unknown, Entered By History     senna-docusate (SENOKOT-S/PERICOLACE) 8.6-50 MG tablet Take 1 tablet by mouth 2 times daily as needed for constipation prn Yes Hadley Jasso MD

## 2024-02-01 NOTE — CONSULTS
GASTROENTEROLOGY CONSULTATION      Yordy Presley  [address] [facility] [address]  48 year old male     Admission Date/Time: 1/31/2024  Primary Care Provider: [facility], [facility]  Referring / Attending Physician:  Mejia JOHNSON     We were asked to see the patient in consultation by Mejia JOHNSON for evaluation of anemia.        HPI:  Yordy Presley is a 48 year old male with medical history of DVT who presented to the emergency room with outpatient lab work revealing a hemoglobin of 6.2.  Gastroenterology was consulted.    Patient is a good historian.  He denies any current hematochezia or melena.  No nausea or vomiting.  He has no abdominal pain.  Occasionally has trouble with heartburn and will use omeprazole.  He denies use of ibuprofen or aspirin.  He has struggled with rectal bleeding from hemorrhoids in the past due to constipation.  He sought medical attention because of progressive shortness of breath and weakness.  Hemoglobin is 7.7 after transfusion of 1 unit of packed red cells.    Patient denies any known history of anemia.  On January 17, 2024 he did undergo colonoscopy because of rectal bleeding through [facility].  His terminal ileum and colon appeared entirely normal.  He was found to have nonthrombosed external hemorrhoids.  He denies any previous upper endoscopy.      PAST MEDICAL HISTORY:  Patient Active Problem List    Diagnosis Date Noted    Symptomatic anemia 01/31/2024     Priority: Medium    Lumbar radiculopathy 06/22/2021     Priority: Medium    Bilateral low back pain with left-sided sciatica 07/25/2017     Priority: Medium    Drug-induced constipation 07/25/2017     Priority: Medium    Hemorrhoids 07/25/2017     Priority: Medium    Family history of colon cancer-father 07/25/2017     Priority: Medium    Gastroesophageal reflux disease, esophagitis presence not specified 07/25/2017     Priority: Medium     IMO Regulatory Load OCT 2020         "    ROS: A comprehensive ten point review of systems was negative aside from those in mentioned in the HPI.       MEDICATIONS:   Prior to Admission medications    Medication Sig Start Date End Date Taking? Authorizing Provider   acetaminophen (TYLENOL) 325 MG tablet Take 650 mg by mouth every 4 hours as needed    Yes Reported, Patient   diazepam (VALIUM) 10 MG tablet Take 1 tablet (10 mg) by mouth every 6 hours as needed for muscle spasms 6/23/21  Yes Hadley Jasso MD   ibuprofen (ADVIL/MOTRIN) 200 MG tablet Take 400-800 mg by mouth every 6 hours as needed for pain   Yes Unknown, Entered By History   olopatadine (PATANOL) 0.1 % ophthalmic solution Place 1 drop into both eyes 2 times daily as needed for allergies   Yes Unknown, Entered By History   omeprazole (PRILOSEC) 20 MG DR capsule Take 20 mg by mouth daily as needed   Yes Unknown, Entered By History   senna-docusate (SENOKOT-S/PERICOLACE) 8.6-50 MG tablet Take 1 tablet by mouth 2 times daily as needed for constipation 6/23/21  Yes Hadley Jasso MD        ALLERGIES:   Allergies   Allergen Reactions    Oxycodone GI Disturbance and Nausea and Vomiting        SOCIAL HISTORY:  Social History     Tobacco Use    Smoking status: Never    Smokeless tobacco: Never   Substance Use Topics    Alcohol use: No    Drug use: No        FAMILY HISTORY:  Family History   Problem Relation Age of Onset    Lymphoma Father 85    Cancer Father     Colon Cancer Father         PHYSICAL EXAM:     /73 (BP Location: Left arm)   Pulse 77   Temp 98.6  F (37  C) (Oral)   Resp 16   Ht 1.905 m (6' 3\")   Wt 86.6 kg (190 lb 14.4 oz)   SpO2 97%   BMI 23.86 kg/m       PHYSICAL EXAM:  GENERAL: No distress, resting comfortably  SKIN: no suspicious lesions, rashes, jaundice  HEAD: Normocephalic. Atraumatic.  NECK: Neck supple. No adenopathy.   EYES: No scleral icterus  GASTROINTESTINAL: +BS, soft, non tender, non distended, no guarding/rebound  JOINT/EXTREMITIES:  no gross " deformities noted, normal muscle tone  NEURO: CN 2-12 grossly intact, no focal deficits  PSYCH: Normal affect        ADDITIONAL COMMENTS:   I reviewed the patient's new clinical lab test results.     Recent Labs   Lab 02/01/24  0742 02/01/24  0011 01/31/24  1518   WBC 5.9 4.9 4.3   RBC 3.10* 2.84* 2.70*   HGB 7.7* 6.9* 6.2*   HCT 24.6* 22.5* 20.9*   MCV 79 79 77*   MCH 24.8* 24.3* 23.0*   MCHC 31.3* 30.7* 29.7*   RDW 16.3* 16.1* 16.4*    249 259     Recent Labs   Lab Test 02/01/24  0742 01/31/24  1518 06/22/21  1243   POTASSIUM 4.2 3.8 4.2   CHLORIDE 106 103 105   CO2 22 25 27   BUN 10.7 11.2 13   ANIONGAP 8 10 3     Recent Labs   Lab Test 02/01/24  0742 01/31/24  1518 04/18/21  1132 07/25/17  0915   ALBUMIN 4.0 4.8 3.9 4.3   BILITOTAL 0.4 0.4 0.5 0.7   ALT 30 31 36 26   AST 38 40 22 16   PROTEIN  --   --   --  Negative       Recent Labs   Lab 01/31/24  1518   INR 1.20*        IMAGING / ENDOSCOPY     CT Chest Pulmonary Embolism w Contrast    Narrative    EXAM: CT CHEST PULMONARY EMBOLISM W CONTRAST  LOCATION: [facility]  DATE: 1/31/2024    INDICATION: shortness of breath, elevated d dimer  COMPARISON: None.  TECHNIQUE: CT chest pulmonary angiogram during arterial phase injection of IV contrast. Multiplanar reformats and MIP reconstructions were performed. Dose reduction techniques were used.   CONTRAST: 75mL Isovue 370    FINDINGS:  ANGIOGRAM CHEST: Pulmonary arteries are normal caliber and negative for pulmonary emboli. Normal caliber thoracic aorta without significant atherosclerotic plaque. No CT evidence of right heart strain.    LUNGS AND PLEURA: Patent central airways. Mild bibasilar atelectasis and/or scarring. No focal consolidation or pleural effusion. Scattered calcified granulomas. Few scattered tiny pulmonary nodules, for example 0.4 cm left lower lobe subpleural nodule   (series 7, image 169).    MEDIASTINUM/AXILLAE: Heart size is normal. No thoracic adenopathy.    CORONARY  ARTERY CALCIFICATION: None.    UPPER ABDOMEN: Normal.    MUSCULOSKELETAL: Thoracic spondylosis. No destructive osseous lesion.      Impression    IMPRESSION:  1.  No acute pulmonary embolism, focal consolidation, pleural effusion.  2.  Few tiny pulmonary nodules. Per Fleischner Society guidelines, if the patient is at low risk for pulmonary malignancy, no dedicated imaging follow-up is required. If the patient is at high risk for pulmonary malignancy, consider optional follow up CT   chest in 12 months.         CONSULTATION ASSESSMENT AND PLAN:    Yordy Presley is a 48 year old with medical history of DVT who presented to the emergency room with outpatient lab work revealing a hemoglobin of 6.2.  Gastroenterology was consulted.    1.  Acute anemia, normocytic: Patient has history of rectal bleeding secondary to hemorrhoids.  These were evaluated with colonoscopy on January 17 [facility].  No other findings to explain his anemia.  There is no evidence of any upper GI bleeding.  Can start a daily PPI.      -- If hemoglobin remains stable, recommend outpatient upper endoscopy to continue anemia workup.  -- Serial hemoglobin, transfusions as needed  -- Hematologic workup of anemia      I discussed the patient plan with Dr. Jorgensen, GI staff physician. Thank you for asking us to participate in the care of this patient.     60 min of total time was spent providing patient care, including patient evaluation, reviewing documentation/ test results, and .     Urmila Winston PA-C  [facility] ( [facility])   -----------  I agree with the assessment and plan of Urmila SHARMA.  Patient denies hematemesis, abdominal pain, melena.  Has some intermittent red blood from hemorrhoids, confirmed on recent colonoscopy from [facility].  ON exam NAD, abd with mild tenderness in epigastric area, nonlabored breathing.  A/P- anemia without evidence of active GI bleed.  Agree with plan as noted above,  he can eat, hopefully home today after his blood transfusions, and he should follow up with [facility] ASAP to discuss EGD as outpatient.    Signing off but please call with questions or concerns.    This was a shared visit and I spent about 20 minute in the care of this patient.    Blayne Jorgensen MD

## 2024-02-01 NOTE — DISCHARGE SUMMARY
[facility]  Hospitalist Discharge Summary      Date of Admission:  1/31/2024  Date of Discharge:  2/1/2024  Discharging Provider: Sergio Milan DO  Discharge Service: Hospitalist Service    Discharge Diagnoses   Acute anemia, suspect acute blood loss.  GERD.      Clinically Significant Risk Factors          Follow-ups Needed After Discharge   Follow-up Appointments     Follow-up and recommended labs and tests       Follow up with primary care provider, [facility],   within 7 days for hospital follow- up.  The following labs/tests are   recommended: CBC within 7 days.  Follow-up with gastroenterology within 7   days.            Discharge Disposition   Discharged to home  Condition at discharge: Stable    Hospital Course     Yordy Presley is a 48 year old male with past medical history of DVT previously on anticoagulation.  He has been having pain in his left lower leg went to the clinic for evaluation and had lab work done he was found to have a hemoglobin of 6.2 and a D-dimer elevated at 1.03.  He was sent to the emergency department for further evaluation.  Upon further review he endorses being short of breath since December.  He states that it is progressive they worsened over the past several weeks.  The shortness of breath is worse with exertion.  In terms of his previous DVT he states she took blood thinners for 2 months but has not been on them since that time.  It was unclear if the DVT was provoked or unprovoked.  He denies any chest pain.  No fever.  Hemoglobin here on repeat is 6.2.  He denies having any dark or tarry stools.  No hematochezia.  He states he has hemorrhoids from being constipated and once in a while he will see some bright red blood on the outside of his stool but has not noticed it in a while.  Hemoccult was negative. He does endorse a past history of ulcers -- unclear type.  Rare NSAID use.  No significant EtOH use.  No hematemesis.   Initially made n.p.o.  Started on continuous IV fluids.  Started on pantoprazole 40 mg IV twice a day.  Seen in consultation by gastroenterology.  Repeat hemoglobin after transfusion has been stable.  Gastroenterology has determined no need for inpatient procedures.  Do recommend following up with his primary gastroenterologist in the outpatient setting within 1 week.  Recheck CBC in 1 week.  Continue omeprazole 20 mg twice a day for now.  Avoid ibuprofen or other NSAIDs.       Consultations This Hospital Stay   GASTROENTEROLOGY IP CONSULT    Code Status   Full Code    Time Spent on this Encounter   I spent 25 minutes with Mr. Presley and working on discharge on 2/1/2024.       Sergio Milan [facility]  [address] [place] [address]  Phone: [phone]  Fax: [phone]  ______________________________________________________________________    Physical Exam   Vital Signs: Temp: 98.6  F (37  C) Temp src: Oral BP: 105/73 Pulse: 77   Resp: 16 SpO2: 97 % O2 Device: None (Room air)    Weight: 190 lbs 14.4 oz  Gen:  NAD, A&Ox3.  Eyes:  PERRL, sclera anicteric.  OP:  MMM, no lesions.  Neck:  Supple.  CV:  Regular, no murmurs.  Lung:  CTA b/l, normal effort.  Ab:  +BS, soft.  Skin:  Warm, dry to touch.  No rash.  Ext:  No pitting edema LE b/l.         Primary Care Physician   [facility]    Discharge Orders      CBC with platelets     Reason for your hospital stay    Acute anemia, suspect acute blood loss anemia.     Follow-up and recommended labs and tests     Follow up with primary care provider, [facility], within 7 days for hospital follow- up.  The following labs/tests are recommended: CBC within 7 days.  Follow-up with gastroenterology within 7 days.     Activity    Your activity upon discharge: activity as tolerated     Diet    Follow this diet upon discharge: Regular         Discharge Medications   Current Discharge  Medication List        CONTINUE these medications which have CHANGED    Details   omeprazole (PRILOSEC) 20 MG DR capsule Take 1 capsule (20 mg) by mouth 2 times daily  Qty: 60 capsule, Refills: 0    Associated Diagnoses: Symptomatic anemia           CONTINUE these medications which have NOT CHANGED    Details   acetaminophen (TYLENOL) 325 MG tablet Take 650 mg by mouth every 4 hours as needed       diazepam (VALIUM) 10 MG tablet Take 1 tablet (10 mg) by mouth every 6 hours as needed for muscle spasms  Qty: 20 tablet, Refills: 0    Associated Diagnoses: Lumbar radiculopathy; Acute bilateral low back pain with left-sided sciatica      olopatadine (PATANOL) 0.1 % ophthalmic solution Place 1 drop into both eyes 2 times daily as needed for allergies      senna-docusate (SENOKOT-S/PERICOLACE) 8.6-50 MG tablet Take 1 tablet by mouth 2 times daily as needed for constipation  Qty: 20 tablet, Refills: 0    Associated Diagnoses: Constipation, unspecified constipation type           STOP taking these medications       ibuprofen (ADVIL/MOTRIN) 200 MG tablet Comments:   Reason for Stopping:             Allergies   Allergies   Allergen Reactions    Oxycodone GI Disturbance and Nausea and Vomiting

## 2024-02-01 NOTE — H&P
[facility]    History and Physical - Hospitalist Service       Date of Admission:  1/31/2024    Assessment & Plan      Yordy Presley is a 48 year old male with past medical history of DVT previously on anticoagulation.  He has been having pain in his left lower leg went to the clinic for evaluation and had lab work done he was found to have a hemoglobin of 6.2 and a D-dimer elevated at 1.03.  He was sent to the emergency department for further evaluation.  Upon further review he endorses being short of breath since December.  He states that it is progressive they worsened over the past several weeks.  The shortness of breath is worse with exertion.  In terms of his previous DVT he states she took blood thinners for 2 months but has not been on them since that time.  It was unclear if the DVT was provoked or unprovoked.  He denies any chest pain.  No fever.  Hemoglobin here on repeat is 6.2.  He denies having any dark or tarry stools.  No hematochezia.  He states he has hemorrhoids from being constipated and once in a while he will see some bright red blood on the outside of his stool but has not noticed it in a while.  Hemoccult was negative. He does endorse a past history of ulcers -- unclear type.  Rare NSAID use.  No significant EtOH use.  No hematemesis.      Acute medical issues:  #Symptomatic anemia. Likely iron deficiency anemia Hemoglobin here 6.2.  Hemoglobin in the internal medicine clinic was 6.2 as well earlier today.  A prior hemoglobin from 2 years ago was normal at 15.6.  His MCV as well as MCH and MCHC are all low. Recently had a colonoscopy 1/17/2024 at  with the terminal ileum appearing normal.  The entire examined colon appeared normal on direct and retroflexion views.  Perianal exam findings include non-thrombosed external hemorrhoids.  Diet typically consists of fast food and salads.  He was encouraged to eat a high fiber diet and use Miralax to treat constipation.    #Constipation:  Chronic   #External hemorrhoids.   --Admit to observation  --T and S. Transfuse 1 unit packed cells tonight.   -- Check vitamin B12, folate, iron studies, ferritin, and peripheral smear.  --Hemoccult is negative.  --Repeat CBC BMP in the AM.  -- NPO at MN.  Consider GI consult in the AM for possible EGD as he recently had a normal colonoscopy but endorses history of gastric ulcers.  No evidence of active bleeding.      #Shortness of breath/dyspnea on exertion: Most likely secondary to above.  Anticipate this will improve with treatment.  CT of the chest did not reveal any acute pathology.  No effusions.  Does have a few tiny pulmonary nodules. Recently had a stress echocardiogram 1/24/2024 which was reported to be normal without evidence of inducible ischemia.   --As needed oxygen for SpO2 less than 90%.  -- Pulmonary toilet.  --As needed albuterol nebs.    #Left lower extremity pain and elevated D-dimer: Venous duplex is negative for DVT.  CT of the chest is reassuring.  D-dimer likely abnormal in the setting of profound symptomatic anemia. Past history of DVT and was on an DOAC for several months a couple of years ago.  Likely pain is related to symptomatic anemia.   -- APAP.  -- Avoid NSAIDs  -- As needed methocarbamol.      Observation Goals: -diagnostic tests and consults completed and resulted, -vital signs normal or at patient baseline, -tolerating oral intake to maintain hydration, -dyspnea improved and O2 sats greater than 88% on room air or prior home oxygen levels, -returns to baseline functional status, Nurse to notify provider when observation goals have been met and patient is ready for discharge.  Diet: Regular Diet Adult. NPO at MN.  DVT Prophylaxis:   [place] Catheter: Not present  Lines: None     Cardiac Monitoring: None  Code Status: Full Code    Clinically Significant Risk Factors Present on Admission               # Coagulation Defect: INR = 1.20 (Ref range: 0.85 - 1.15) and/or  PTT = N/A, will monitor for bleeding                    Disposition Plan  per clinical course     Expected Discharge Date: 02/01/2024                The patient's care was discussed with the Patient, Patient's Family, and EM Team.    CAMILLE Iyer [facility]  Hospitalist Service  [facility]  Securely message with [employer] (more info)  Text page via [facility] Paging/Directory       Addendum:   1/31/2024 11:15 PM   Iron studies returning consistent with significant iron deficiency.  Will also give IV iron tonight.    ___________________________________________________________    Chief Complaint   Multiple    History is obtained from the patient, electronic health record, and emergency department physician    History of Present Illness   Yordy Presley is a 48 year old male with past medical history of DVT previously on anticoagulation.  He has been having pain in his left lower leg went to the clinic for evaluation and had lab work done he was found to have a hemoglobin of 6.2 and a D-dimer elevated at 1.03.  He was sent to the emergency department for further evaluation.  Upon further review he endorses being short of breath since December.  He states that it is progressive they worsened over the past several weeks.  The shortness of breath is worse with exertion.  In terms of his previous DVT he states she took blood thinners for 2 months but has not been on them since that time.  It was unclear if the DVT was provoked or unprovoked.  He denies any chest pain.  No fever.  Hemoglobin here on repeat is 6.2.  He denies having any dark or tarry stools.  No hematochezia.  He states he has hemorrhoids from being constipated and once in a while he will see some bright red blood on the outside of his stool but has not noticed it in a while.  Hemoccult was negative.    ED course: IV, labs, imaging and treatment  Transfusing 1 unit packed cells.  Pertinent Labs: BC reveals a hemoglobin of 6.2, platelet  count 259,000.  Differential line is within normal limits.  CMP is within normal limits.  INR is mildly elevated 1.2.  Hemoccult is negative.  Pertinent imaging: Venous duplex of the left lower extremity is negative for DVT.  A CT of the chest PE protocol reveals no acute pulmonary embolism, focal consolidation, or pleural effusion.  He does have a few tiny pulmonary nodules and scattered calcified granulomas.    He is being admitted for blood transfusion and serial monitoring.      Past Medical History    Past Medical History:   Diagnosis Date    NO ACTIVE PROBLEMS        Past Surgical History   Past Surgical History:   Procedure Laterality Date    BACK SURGERY      BACK SURGERY      fusion - 6- 8 yrs ago    TONSILLECTOMY      WRIST SURGERY         Prior to Admission Medications   Prior to Admission Medications   Prescriptions Last Dose Informant Patient Reported? Taking?   acetaminophen (TYLENOL) 325 MG tablet prn  Yes Yes   Sig: Take 650 mg by mouth every 4 hours as needed    diazepam (VALIUM) 10 MG tablet prn  No Yes   Sig: Take 1 tablet (10 mg) by mouth every 6 hours as needed for muscle spasms   ibuprofen (ADVIL/MOTRIN) 200 MG tablet prn  Yes Yes   Sig: Take 400-800 mg by mouth every 6 hours as needed for pain   olopatadine (PATANOL) 0.1 % ophthalmic solution prn  Yes Yes   Sig: Place 1 drop into both eyes 2 times daily as needed for allergies   omeprazole (PRILOSEC) 20 MG DR capsule prn  Yes Yes   Sig: Take 20 mg by mouth daily as needed   senna-docusate (SENOKOT-S/PERICOLACE) 8.6-50 MG tablet prn  No Yes   Sig: Take 1 tablet by mouth 2 times daily as needed for constipation      Facility-Administered Medications: None        Review of Systems    12 point ROS as above otherwise negative.    Social History   I have reviewed this patient's social history and updated it with pertinent information if needed.  Social History     Tobacco Use    Smoking status: Never    Smokeless tobacco: Never   Substance Use  Topics    Alcohol use: No    Drug use: No         Family History   I have reviewed this patient's family history and updated it with pertinent information if needed.  Family History   Problem Relation Age of Onset    Lymphoma Father 85    Cancer Father     Colon Cancer Father          Allergies   Allergies   Allergen Reactions    Oxycodone GI Disturbance and Nausea and Vomiting        Physical Exam   Vital Signs: Temp: 98.9  F (37.2  C) Temp src: Oral BP: 125/84 Pulse: 96   Resp: 16 SpO2: 98 %      Weight: 195 lbs 0 oz    GEN:   Alert, oriented x 3, appears comfortable, NAD.  NECK:   Supple ,no mass or thyromegaly   HEENT:  Normocephalic/atraumatic, no scleral icterus, no nasal discharge, mouth dry. Anemic tongue.   CV:   Regular rate and rhythm, no murmur or JVD.  S1 + S2 noted, no S3 or S4.  LUNGS:   Clear to auscultation bilaterally without rales/rhonchi/wheezing/retractions.  Symmetric chest rise on inhalation noted.  ABD:   Active bowel sounds, soft, non-tender/non-distended.  No rebound/guarding/rigidity.  EXT:   No edema.  No cyanosis.  No joint synovitis noted.  SKIN:   Dry to touch, no exanthems noted in the visualized areas.  Neurologic: Grossly intact,non focal. Spine:  No CTLS tenderness.  Neuropsychiatric:  General: normal, calm and normal eye contact  Level of consciousness: alert / normal  Affect: normal  Orientation: oriented to self, place, time and situation     Medical Decision Making       90 MINUTES SPENT BY ME on the date of service doing chart review, history, exam, documentation & further activities per the note.      Data   ------------------------- PAST 24 HR DATA REVIEWED -----------------------------------------------    I have personally reviewed the following data over the past 24 hrs:    4.3  \   6.2 (LL)   / 259     138 103 11.2 /  102 (H)   3.8 25 0.77 \     ALT: 31 AST: 40 AP: 61 TBILI: 0.4   ALB: 4.8 TOT PROTEIN: 7.4 LIPASE: N/A     INR:  1.20 (H) PTT:  N/A   D-dimer:  N/A  Fibrinogen:  N/A       Imaging results reviewed over the past 24 hrs:   Recent Results (from the past 24 hour(s))   US Lower Extremity Venous Duplex Left    Narrative    EXAM: US LOWER EXTREMITY VENOUS DUPLEX LEFT  LOCATION: [facility]  DATE: 1/31/2024    INDICATION: leg pain hx of DVT  COMPARISON: None.  TECHNIQUE: Venous Duplex ultrasound of the left lower extremity with and without compression, augmentation and duplex. Color flow and spectral Doppler with waveform analysis performed.    FINDINGS: Exam includes the common femoral, femoral, popliteal, and contralateral common femoral veins as well as segmentally visualized deep calf veins and greater saphenous vein.     LEFT: No deep vein thrombosis. No superficial thrombophlebitis. No popliteal cyst.      Impression    IMPRESSION:  1.  No deep venous thrombosis in the left lower extremity.   CT Chest Pulmonary Embolism w Contrast    Narrative    EXAM: CT CHEST PULMONARY EMBOLISM W CONTRAST  LOCATION: [facility]  DATE: 1/31/2024    INDICATION: shortness of breath, elevated d dimer  COMPARISON: None.  TECHNIQUE: CT chest pulmonary angiogram during arterial phase injection of IV contrast. Multiplanar reformats and MIP reconstructions were performed. Dose reduction techniques were used.   CONTRAST: 75mL Isovue 370    FINDINGS:  ANGIOGRAM CHEST: Pulmonary arteries are normal caliber and negative for pulmonary emboli. Normal caliber thoracic aorta without significant atherosclerotic plaque. No CT evidence of right heart strain.    LUNGS AND PLEURA: Patent central airways. Mild bibasilar atelectasis and/or scarring. No focal consolidation or pleural effusion. Scattered calcified granulomas. Few scattered tiny pulmonary nodules, for example 0.4 cm left lower lobe subpleural nodule   (series 7, image 169).    MEDIASTINUM/AXILLAE: Heart size is normal. No thoracic adenopathy.    CORONARY ARTERY CALCIFICATION: None.    UPPER  ABDOMEN: Normal.    MUSCULOSKELETAL: Thoracic spondylosis. No destructive osseous lesion.      Impression    IMPRESSION:  1.  No acute pulmonary embolism, focal consolidation, pleural effusion.  2.  Few tiny pulmonary nodules. Per Fleischner Society guidelines, if the patient is at low risk for pulmonary malignancy, no dedicated imaging follow-up is required. If the patient is at high risk for pulmonary malignancy, consider optional follow up CT   chest in 12 months.

## 2024-02-04 ENCOUNTER — HOSPITAL ENCOUNTER (OUTPATIENT)
Facility: CLINIC | Age: 49
Setting detail: OBSERVATION
Discharge: HOME | End: 2024-02-06
Attending: EMERGENCY MEDICINE | Admitting: EMERGENCY MEDICINE
Payer: COMMERCIAL

## 2024-02-04 DIAGNOSIS — M54.16 LUMBAR RADICULOPATHY: ICD-10-CM

## 2024-02-04 DIAGNOSIS — M54.42 ACUTE BILATERAL LOW BACK PAIN WITH LEFT-SIDED SCIATICA: Primary | ICD-10-CM

## 2024-02-04 DIAGNOSIS — K59.00 CONSTIPATION, UNSPECIFIED CONSTIPATION TYPE: ICD-10-CM

## 2024-02-04 LAB
ABO/RH(D): NORMAL
ALBUMIN SERPL BCG-MCNC: 4.7 G/DL (ref 3.5–5.2)
ALBUMIN UR-MCNC: NEGATIVE MG/DL
ALP SERPL-CCNC: 63 U/L (ref 40–150)
ALT SERPL W P-5'-P-CCNC: 42 U/L (ref 0–70)
ANION GAP SERPL CALCULATED.3IONS-SCNC: 8 MMOL/L (ref 7–15)
ANTIBODY SCREEN: NEGATIVE
APPEARANCE UR: CLEAR
AST SERPL W P-5'-P-CCNC: 47 U/L (ref 0–45)
BASOPHILS # BLD AUTO: 0 10E3/UL (ref 0–0.2)
BASOPHILS NFR BLD AUTO: 1 %
BILIRUB SERPL-MCNC: 0.2 MG/DL
BILIRUB UR QL STRIP: NEGATIVE
BUN SERPL-MCNC: 17.5 MG/DL (ref 6–20)
CALCIUM SERPL-MCNC: 9.3 MG/DL (ref 8.6–10)
CHLORIDE SERPL-SCNC: 102 MMOL/L (ref 98–107)
COLOR UR AUTO: ABNORMAL
CREAT SERPL-MCNC: 0.79 MG/DL (ref 0.67–1.17)
DEPRECATED HCO3 PLAS-SCNC: 26 MMOL/L (ref 22–29)
EGFRCR SERPLBLD CKD-EPI 2021: >90 ML/MIN/1.73M2
EOSINOPHIL # BLD AUTO: 0 10E3/UL (ref 0–0.7)
EOSINOPHIL NFR BLD AUTO: 1 %
ERYTHROCYTE [DISTWIDTH] IN BLOOD BY AUTOMATED COUNT: 18.5 % (ref 10–15)
GLUCOSE SERPL-MCNC: 100 MG/DL (ref 70–99)
GLUCOSE UR STRIP-MCNC: NEGATIVE MG/DL
HCT VFR BLD AUTO: 28.6 % (ref 40–53)
HGB BLD-MCNC: 8.8 G/DL (ref 13.3–17.7)
HGB UR QL STRIP: NEGATIVE
HOLD SPECIMEN: NORMAL
IMM GRANULOCYTES # BLD: 0 10E3/UL
IMM GRANULOCYTES NFR BLD: 0 %
KETONES UR STRIP-MCNC: NEGATIVE MG/DL
LEUKOCYTE ESTERASE UR QL STRIP: NEGATIVE
LYMPHOCYTES # BLD AUTO: 0.8 10E3/UL (ref 0.8–5.3)
LYMPHOCYTES NFR BLD AUTO: 12 %
MCH RBC QN AUTO: 24.7 PG (ref 26.5–33)
MCHC RBC AUTO-ENTMCNC: 30.8 G/DL (ref 31.5–36.5)
MCV RBC AUTO: 80 FL (ref 78–100)
MONOCYTES # BLD AUTO: 0.4 10E3/UL (ref 0–1.3)
MONOCYTES NFR BLD AUTO: 6 %
MUCOUS THREADS #/AREA URNS LPF: PRESENT /LPF
NEUTROPHILS # BLD AUTO: 5.3 10E3/UL (ref 1.6–8.3)
NEUTROPHILS NFR BLD AUTO: 80 %
NITRATE UR QL: NEGATIVE
NRBC # BLD AUTO: 0 10E3/UL
NRBC BLD AUTO-RTO: 0 /100
PH UR STRIP: 7 [PH] (ref 5–7)
PLATELET # BLD AUTO: 258 10E3/UL (ref 150–450)
POTASSIUM SERPL-SCNC: 4 MMOL/L (ref 3.4–5.3)
PROT SERPL-MCNC: 7.7 G/DL (ref 6.4–8.3)
RBC # BLD AUTO: 3.56 10E6/UL (ref 4.4–5.9)
RBC URINE: 0 /HPF
SODIUM SERPL-SCNC: 136 MMOL/L (ref 135–145)
SP GR UR STRIP: 1.02 (ref 1–1.03)
SPECIMEN EXPIRATION DATE: NORMAL
UROBILINOGEN UR STRIP-MCNC: NORMAL MG/DL
WBC # BLD AUTO: 6.5 10E3/UL (ref 4–11)
WBC URINE: 1 /HPF

## 2024-02-04 PROCEDURE — 99223 1ST HOSP IP/OBS HIGH 75: CPT | Mod: AI | Performed by: STUDENT IN AN ORGANIZED HEALTH CARE EDUCATION/TRAINING PROGRAM

## 2024-02-04 PROCEDURE — 82040 ASSAY OF SERUM ALBUMIN: CPT | Performed by: EMERGENCY MEDICINE

## 2024-02-04 PROCEDURE — 85025 COMPLETE CBC W/AUTO DIFF WBC: CPT | Performed by: EMERGENCY MEDICINE

## 2024-02-04 PROCEDURE — 250N000011 HC RX IP 250 OP 636: Performed by: EMERGENCY MEDICINE

## 2024-02-04 PROCEDURE — 99285 EMERGENCY DEPT VISIT HI MDM: CPT | Mod: 25

## 2024-02-04 PROCEDURE — 250N000013 HC RX MED GY IP 250 OP 250 PS 637: Performed by: EMERGENCY MEDICINE

## 2024-02-04 PROCEDURE — 86900 BLOOD TYPING SEROLOGIC ABO: CPT | Performed by: EMERGENCY MEDICINE

## 2024-02-04 PROCEDURE — 36415 COLL VENOUS BLD VENIPUNCTURE: CPT | Performed by: EMERGENCY MEDICINE

## 2024-02-04 PROCEDURE — G0378 HOSPITAL OBSERVATION PER HR: HCPCS

## 2024-02-04 PROCEDURE — 81001 URINALYSIS AUTO W/SCOPE: CPT | Performed by: EMERGENCY MEDICINE

## 2024-02-04 PROCEDURE — 96374 THER/PROPH/DIAG INJ IV PUSH: CPT

## 2024-02-04 RX ORDER — SENNOSIDES 8.6 MG
8.6 TABLET ORAL 2 TIMES DAILY
Status: DISCONTINUED | OUTPATIENT
Start: 2024-02-05 | End: 2024-02-05

## 2024-02-04 RX ORDER — LIDOCAINE 4 G/G
1 PATCH TOPICAL ONCE
Status: COMPLETED | OUTPATIENT
Start: 2024-02-04 | End: 2024-02-05

## 2024-02-04 RX ORDER — DIAZEPAM 5 MG
5 TABLET ORAL ONCE
Status: COMPLETED | OUTPATIENT
Start: 2024-02-04 | End: 2024-02-04

## 2024-02-04 RX ORDER — DEXAMETHASONE SODIUM PHOSPHATE 10 MG/ML
10 INJECTION, SOLUTION INTRAMUSCULAR; INTRAVENOUS ONCE
Status: COMPLETED | OUTPATIENT
Start: 2024-02-04 | End: 2024-02-05

## 2024-02-04 RX ORDER — OXYCODONE HYDROCHLORIDE 5 MG/1
5 TABLET ORAL
Status: DISCONTINUED | OUTPATIENT
Start: 2024-02-04 | End: 2024-02-06

## 2024-02-04 RX ORDER — POLYETHYLENE GLYCOL 3350 17 G/17G
17 POWDER, FOR SOLUTION ORAL DAILY
Status: DISCONTINUED | OUTPATIENT
Start: 2024-02-05 | End: 2024-02-06 | Stop reason: HOSPADM

## 2024-02-04 RX ORDER — ACETAMINOPHEN 325 MG/1
650 TABLET ORAL EVERY 6 HOURS
Status: DISCONTINUED | OUTPATIENT
Start: 2024-02-04 | End: 2024-02-05

## 2024-02-04 RX ORDER — OXYCODONE HYDROCHLORIDE 5 MG/1
10 TABLET ORAL
Status: DISCONTINUED | OUTPATIENT
Start: 2024-02-04 | End: 2024-02-06

## 2024-02-04 RX ORDER — HYDROMORPHONE HYDROCHLORIDE 1 MG/ML
0.5 INJECTION, SOLUTION INTRAMUSCULAR; INTRAVENOUS; SUBCUTANEOUS EVERY 30 MIN PRN
Status: DISCONTINUED | OUTPATIENT
Start: 2024-02-04 | End: 2024-02-06 | Stop reason: HOSPADM

## 2024-02-04 RX ORDER — FERROUS SULFATE 325(65) MG
325 TABLET ORAL DAILY
Status: DISCONTINUED | OUTPATIENT
Start: 2024-02-05 | End: 2024-02-06 | Stop reason: HOSPADM

## 2024-02-04 RX ORDER — CYCLOBENZAPRINE HCL 10 MG
10 TABLET ORAL 3 TIMES DAILY
Status: DISCONTINUED | OUTPATIENT
Start: 2024-02-05 | End: 2024-02-06 | Stop reason: HOSPADM

## 2024-02-04 RX ADMIN — DIAZEPAM 5 MG: 5 TABLET ORAL at 22:00

## 2024-02-04 RX ADMIN — HYDROMORPHONE HYDROCHLORIDE 0.5 MG: 1 INJECTION, SOLUTION INTRAMUSCULAR; INTRAVENOUS; SUBCUTANEOUS at 22:06

## 2024-02-04 RX ADMIN — LIDOCAINE 1 PATCH: 4 PATCH TOPICAL at 22:15

## 2024-02-04 ASSESSMENT — ACTIVITIES OF DAILY LIVING (ADL): ADLS_ACUITY_SCORE: 35

## 2024-02-05 ENCOUNTER — APPOINTMENT (OUTPATIENT)
Dept: MRI IMAGING | Facility: CLINIC | Age: 49
End: 2024-02-05
Attending: STUDENT IN AN ORGANIZED HEALTH CARE EDUCATION/TRAINING PROGRAM
Payer: COMMERCIAL

## 2024-02-05 ENCOUNTER — APPOINTMENT (OUTPATIENT)
Dept: PHYSICAL THERAPY | Facility: CLINIC | Age: 49
End: 2024-02-05
Attending: STUDENT IN AN ORGANIZED HEALTH CARE EDUCATION/TRAINING PROGRAM
Payer: COMMERCIAL

## 2024-02-05 PROCEDURE — 250N000013 HC RX MED GY IP 250 OP 250 PS 637: Performed by: INTERNAL MEDICINE

## 2024-02-05 PROCEDURE — 97116 GAIT TRAINING THERAPY: CPT | Mod: GP

## 2024-02-05 PROCEDURE — 255N000002 HC RX 255 OP 636: Performed by: STUDENT IN AN ORGANIZED HEALTH CARE EDUCATION/TRAINING PROGRAM

## 2024-02-05 PROCEDURE — 99204 OFFICE O/P NEW MOD 45 MIN: CPT

## 2024-02-05 PROCEDURE — 250N000012 HC RX MED GY IP 250 OP 636 PS 637

## 2024-02-05 PROCEDURE — 99232 SBSQ HOSP IP/OBS MODERATE 35: CPT | Performed by: INTERNAL MEDICINE

## 2024-02-05 PROCEDURE — 72158 MRI LUMBAR SPINE W/O & W/DYE: CPT

## 2024-02-05 PROCEDURE — 250N000011 HC RX IP 250 OP 636: Performed by: EMERGENCY MEDICINE

## 2024-02-05 PROCEDURE — 97530 THERAPEUTIC ACTIVITIES: CPT | Mod: GP

## 2024-02-05 PROCEDURE — 72197 MRI PELVIS W/O & W/DYE: CPT

## 2024-02-05 PROCEDURE — G0378 HOSPITAL OBSERVATION PER HR: HCPCS

## 2024-02-05 PROCEDURE — 250N000011 HC RX IP 250 OP 636: Performed by: STUDENT IN AN ORGANIZED HEALTH CARE EDUCATION/TRAINING PROGRAM

## 2024-02-05 PROCEDURE — 250N000013 HC RX MED GY IP 250 OP 250 PS 637: Performed by: STUDENT IN AN ORGANIZED HEALTH CARE EDUCATION/TRAINING PROGRAM

## 2024-02-05 PROCEDURE — 97161 PT EVAL LOW COMPLEX 20 MIN: CPT | Mod: GP

## 2024-02-05 PROCEDURE — A9585 GADOBUTROL INJECTION: HCPCS | Performed by: STUDENT IN AN ORGANIZED HEALTH CARE EDUCATION/TRAINING PROGRAM

## 2024-02-05 RX ORDER — ONDANSETRON 2 MG/ML
4 INJECTION INTRAMUSCULAR; INTRAVENOUS EVERY 6 HOURS PRN
Status: DISCONTINUED | OUTPATIENT
Start: 2024-02-05 | End: 2024-02-06 | Stop reason: HOSPADM

## 2024-02-05 RX ORDER — AMOXICILLIN 250 MG
1 CAPSULE ORAL 2 TIMES DAILY PRN
Status: DISCONTINUED | OUTPATIENT
Start: 2024-02-05 | End: 2024-02-06 | Stop reason: HOSPADM

## 2024-02-05 RX ORDER — NALOXONE HYDROCHLORIDE 0.4 MG/ML
0.4 INJECTION, SOLUTION INTRAMUSCULAR; INTRAVENOUS; SUBCUTANEOUS
Status: DISCONTINUED | OUTPATIENT
Start: 2024-02-05 | End: 2024-02-06 | Stop reason: HOSPADM

## 2024-02-05 RX ORDER — CYCLOBENZAPRINE HCL 10 MG
10 TABLET ORAL 3 TIMES DAILY PRN
Status: DISCONTINUED | OUTPATIENT
Start: 2024-02-05 | End: 2024-02-05

## 2024-02-05 RX ORDER — POLYETHYLENE GLYCOL 3350 17 G/17G
17 POWDER, FOR SOLUTION ORAL 2 TIMES DAILY PRN
Status: DISCONTINUED | OUTPATIENT
Start: 2024-02-05 | End: 2024-02-06 | Stop reason: HOSPADM

## 2024-02-05 RX ORDER — CYCLOBENZAPRINE HCL 10 MG
10 TABLET ORAL 3 TIMES DAILY PRN
Status: ON HOLD | COMMUNITY
End: 2024-02-06

## 2024-02-05 RX ORDER — POLYETHYLENE GLYCOL 3350 17 G/17G
17 POWDER, FOR SOLUTION ORAL 2 TIMES DAILY PRN
COMMUNITY

## 2024-02-05 RX ORDER — LIDOCAINE 4 G/G
1 PATCH TOPICAL
Status: DISCONTINUED | OUTPATIENT
Start: 2024-02-05 | End: 2024-02-06

## 2024-02-05 RX ORDER — ACETAMINOPHEN 325 MG/1
975 TABLET ORAL 3 TIMES DAILY
Status: DISCONTINUED | OUTPATIENT
Start: 2024-02-05 | End: 2024-02-06 | Stop reason: HOSPADM

## 2024-02-05 RX ORDER — SENNOSIDES 8.6 MG
2 TABLET ORAL 2 TIMES DAILY
Status: DISCONTINUED | OUTPATIENT
Start: 2024-02-05 | End: 2024-02-06 | Stop reason: HOSPADM

## 2024-02-05 RX ORDER — BISACODYL 10 MG
10 SUPPOSITORY, RECTAL RECTAL DAILY PRN
Status: DISCONTINUED | OUTPATIENT
Start: 2024-02-05 | End: 2024-02-06 | Stop reason: HOSPADM

## 2024-02-05 RX ORDER — HYDROCODONE BITARTRATE AND ACETAMINOPHEN 5; 325 MG/1; MG/1
1-2 TABLET ORAL EVERY 6 HOURS PRN
Status: ON HOLD | COMMUNITY
End: 2024-02-06

## 2024-02-05 RX ORDER — ONDANSETRON 4 MG/1
4 TABLET, ORALLY DISINTEGRATING ORAL EVERY 6 HOURS PRN
Status: DISCONTINUED | OUTPATIENT
Start: 2024-02-05 | End: 2024-02-06 | Stop reason: HOSPADM

## 2024-02-05 RX ORDER — AMOXICILLIN 250 MG
2 CAPSULE ORAL 2 TIMES DAILY PRN
Status: DISCONTINUED | OUTPATIENT
Start: 2024-02-05 | End: 2024-02-06 | Stop reason: HOSPADM

## 2024-02-05 RX ORDER — NALOXONE HYDROCHLORIDE 0.4 MG/ML
0.2 INJECTION, SOLUTION INTRAMUSCULAR; INTRAVENOUS; SUBCUTANEOUS
Status: DISCONTINUED | OUTPATIENT
Start: 2024-02-05 | End: 2024-02-06 | Stop reason: HOSPADM

## 2024-02-05 RX ORDER — OLOPATADINE HYDROCHLORIDE 1 MG/ML
1 SOLUTION/ DROPS OPHTHALMIC 2 TIMES DAILY PRN
Status: DISCONTINUED | OUTPATIENT
Start: 2024-02-05 | End: 2024-02-06 | Stop reason: HOSPADM

## 2024-02-05 RX ORDER — PANTOPRAZOLE SODIUM 40 MG/1
40 TABLET, DELAYED RELEASE ORAL 2 TIMES DAILY
Status: DISCONTINUED | OUTPATIENT
Start: 2024-02-05 | End: 2024-02-06 | Stop reason: HOSPADM

## 2024-02-05 RX ORDER — PANTOPRAZOLE SODIUM 40 MG/1
40 TABLET, DELAYED RELEASE ORAL
Status: DISCONTINUED | OUTPATIENT
Start: 2024-02-05 | End: 2024-02-05

## 2024-02-05 RX ORDER — DEXAMETHASONE 2 MG/1
4 TABLET ORAL EVERY 8 HOURS SCHEDULED
Status: DISCONTINUED | OUTPATIENT
Start: 2024-02-05 | End: 2024-02-06 | Stop reason: HOSPADM

## 2024-02-05 RX ORDER — GADOBUTROL 604.72 MG/ML
9 INJECTION INTRAVENOUS ONCE
Status: COMPLETED | OUTPATIENT
Start: 2024-02-05 | End: 2024-02-05

## 2024-02-05 RX ORDER — AMOXICILLIN 250 MG
1 CAPSULE ORAL 2 TIMES DAILY PRN
Status: DISCONTINUED | OUTPATIENT
Start: 2024-02-05 | End: 2024-02-05

## 2024-02-05 RX ADMIN — OXYCODONE HYDROCHLORIDE 10 MG: 5 TABLET ORAL at 01:53

## 2024-02-05 RX ADMIN — ONDANSETRON 4 MG: 4 TABLET, ORALLY DISINTEGRATING ORAL at 10:21

## 2024-02-05 RX ADMIN — ONDANSETRON 4 MG: 4 TABLET, ORALLY DISINTEGRATING ORAL at 16:43

## 2024-02-05 RX ADMIN — CYCLOBENZAPRINE 10 MG: 10 TABLET, FILM COATED ORAL at 20:29

## 2024-02-05 RX ADMIN — PANTOPRAZOLE SODIUM 40 MG: 40 TABLET, DELAYED RELEASE ORAL at 20:29

## 2024-02-05 RX ADMIN — LIDOCAINE 1 PATCH: 4 PATCH TOPICAL at 20:28

## 2024-02-05 RX ADMIN — FERROUS SULFATE TAB 325 MG (65 MG ELEMENTAL FE) 325 MG: 325 (65 FE) TAB at 09:03

## 2024-02-05 RX ADMIN — CYCLOBENZAPRINE 10 MG: 10 TABLET, FILM COATED ORAL at 09:03

## 2024-02-05 RX ADMIN — CYCLOBENZAPRINE 10 MG: 10 TABLET, FILM COATED ORAL at 13:36

## 2024-02-05 RX ADMIN — ACETAMINOPHEN 650 MG: 325 TABLET, FILM COATED ORAL at 00:09

## 2024-02-05 RX ADMIN — SENNOSIDES 2 TABLET: 8.6 TABLET, FILM COATED ORAL at 20:29

## 2024-02-05 RX ADMIN — SENNOSIDES 8.6 MG: 8.6 TABLET, FILM COATED ORAL at 09:03

## 2024-02-05 RX ADMIN — GADOBUTROL 9 ML: 604.72 INJECTION INTRAVENOUS at 05:02

## 2024-02-05 RX ADMIN — OXYCODONE HYDROCHLORIDE 5 MG: 5 TABLET ORAL at 12:18

## 2024-02-05 RX ADMIN — HYDROMORPHONE HYDROCHLORIDE 0.5 MG: 1 INJECTION, SOLUTION INTRAMUSCULAR; INTRAVENOUS; SUBCUTANEOUS at 21:27

## 2024-02-05 RX ADMIN — OXYCODONE HYDROCHLORIDE 10 MG: 5 TABLET ORAL at 06:57

## 2024-02-05 RX ADMIN — ACETAMINOPHEN 650 MG: 325 TABLET, FILM COATED ORAL at 11:08

## 2024-02-05 RX ADMIN — DEXAMETHASONE 4 MG: 2 TABLET ORAL at 13:36

## 2024-02-05 RX ADMIN — DEXAMETHASONE SODIUM PHOSPHATE 10 MG: 10 INJECTION INTRAMUSCULAR; INTRAVENOUS at 00:06

## 2024-02-05 RX ADMIN — ACETAMINOPHEN 650 MG: 325 TABLET, FILM COATED ORAL at 05:21

## 2024-02-05 RX ADMIN — ACETAMINOPHEN 975 MG: 325 TABLET, FILM COATED ORAL at 21:27

## 2024-02-05 RX ADMIN — ACETAMINOPHEN 975 MG: 325 TABLET, FILM COATED ORAL at 16:38

## 2024-02-05 RX ADMIN — PANTOPRAZOLE SODIUM 40 MG: 40 TABLET, DELAYED RELEASE ORAL at 06:51

## 2024-02-05 RX ADMIN — DEXAMETHASONE 4 MG: 2 TABLET ORAL at 21:27

## 2024-02-05 RX ADMIN — POLYETHYLENE GLYCOL 3350 17 G: 17 POWDER, FOR SOLUTION ORAL at 09:03

## 2024-02-05 ASSESSMENT — ACTIVITIES OF DAILY LIVING (ADL)
ADLS_ACUITY_SCORE: 24
ADLS_ACUITY_SCORE: 27
ADLS_ACUITY_SCORE: 24
ADLS_ACUITY_SCORE: 27
ADLS_ACUITY_SCORE: 27
ADLS_ACUITY_SCORE: 24
ADLS_ACUITY_SCORE: 27
ADLS_ACUITY_SCORE: 24
ADLS_ACUITY_SCORE: 27
ADLS_ACUITY_SCORE: 35

## 2024-02-05 NOTE — PROGRESS NOTES
Observation goals  PRIOR TO DISCHARGE        Comments: -  diagnostic tests and consults completed and resulted  -vital signs normal or at patient baseline yes  -safe disposition plan has been identified not  Nurse to notify provider when observation goals   have been met and patient is ready for discharge. No  Alert and oriented X4, VSS on RA, regular diet.  Voids in bedside urinal with adequate urine output. Unable to ambulate due to back pain. Pain managed with prn oxycodone.

## 2024-02-05 NOTE — TREATMENT PLAN
Consult received and changed to Neurosurgery    Per spine protocol, patient has not been seen at [facility] within the past 2 years.  Will defer consult to NS.     Thank you, Alexandrea Wright PA-C on 2/5/2024 at 11:42 AM

## 2024-02-05 NOTE — PROGRESS NOTES
"   02/05/24 1500   Appointment Info   Signing Clinician's Name / Credentials (PT) Yvon Edmonds DPT   Living Environment   People in Home spouse;child(jaqui), dependent  (2 daughters and 1 son)   Current Living Arrangements house   Home Accessibility stairs to enter home;stairs within home   Number of Stairs, Main Entrance 6   Stair Railings, Main Entrance railing on right side (ascending)   Number of Stairs, Within Home, Primary six   Stair Railings, Within Home, Primary railings on both sides of stairs   Transportation Anticipated family or friend will provide   Living Environment Comments Pt lives in house with spouse and 3 kids   Self-Care   Usual Activity Tolerance good   Current Activity Tolerance fair   Equipment Currently Used at Home none   Fall history within last six months no   Activity/Exercise/Self-Care Comment Pt IND with mobility and ADL's at baseline. Pt has been gettting assist from family for mobility when needed when acute back pain is present. Pt reports when he has sudden sharp pain his legs get weak and almost \"give out\" has had several near misses for falls   General Information   Onset of Illness/Injury or Date of Surgery 02/05/24   Referring Physician María Montano MD   Patient/Family Therapy Goals Statement (PT) go home   Pertinent History of Current Problem (include personal factors and/or comorbidities that impact the POC) Yordy Presley is a 48 year old male with hx of chronic low back pain s/p multiple spine surgeries and prior DVT no longer on anticoagulation who was admitted on 2/4/2024 for acute on chronic back pain   Existing Precautions/Restrictions fall   General Observations pt appears fearful of movement throughout session, 2 supportive daughters present   Cognition   Affect/Mental Status (Cognition) WFL   Orientation Status (Cognition) oriented x 4   Follows Commands (Cognition) WNL   Pain Assessment   Patient Currently in Pain Yes, see Vital Sign flowsheet  (pt reports " 6-7/10 pain/pressure when ambulating)   Integumentary/Edema   Integumentary/Edema no deficits were identifed   Posture    Posture Forward head position   Range of Motion (ROM)   Range of Motion ROM deficits secondary to pain   ROM Comment deficits with spinal ROM due to pain   Strength (Manual Muscle Testing)   Strength (Manual Muscle Testing) Able to perform R SLR;Able to perform L SLR;Deficits observed during functional mobility   Strength Comments Not formally assessed but deficits noted during mobility secondary to pain, appears grossly antigravity strength bilat UE's and LE's   Bed Mobility   Comment, (Bed Mobility) supine<>sit via logroll Hortensia   Transfers   Comment, (Transfers) sit<>stand with FWW CGA   Gait/Stairs (Locomotion)   [place] Level (Gait) contact guard   Assistive Device (Gait) walker, front-wheeled   Distance in Feet (Gait) 10'   Pattern (Gait) step-through   Deviations/Abnormal Patterns (Gait) antalgic;joi decreased;gait speed decreased;stride length decreased;weight shifting decreased   Comment, (Gait/Stairs) extremely slow but steady with FWW   Balance   Balance Comments standing balance with FWW okay, dynamic balance okay during session without pain exacerbation   Sensory Examination   Sensory Perception patient reports no sensory changes   Clinical Impression   Criteria for Skilled Therapeutic Intervention Yes, treatment indicated   PT Diagnosis (PT) impaired gait   Influenced by the following impairments pain, deficits with ROM, strength, balance   Functional limitations due to impairments bed mobility, transfer, amb, ADL's, stairs   Clinical Presentation (PT Evaluation Complexity) stable   Clinical Presentation Rationale PMH and clinical judgement   Clinical Decision Making (Complexity) low complexity   Planned Therapy Interventions (PT) balance training;bed mobility training;cryotherapy;gait training;home exercise program;patient/family education;ROM (range of motion);stair  training;strengthening;stretching;transfer training;progressive activity/exercise   Risk & Benefits of therapy have been explained evaluation/treatment results reviewed;care plan/treatment goals reviewed;risks/benefits reviewed;current/potential barriers reviewed;participants voiced agreement with care plan;participants included;patient;daughter   PT Total Evaluation Time   PT Eval, Low Complexity Minutes (12756) 10   Physical Therapy Goals   PT Frequency Daily   PT Predicted Duration/Target Date for Goal Attainment 02/08/24   PT Goals Bed Mobility;Transfers;Gait;Stairs   PT: Bed Mobility Supine to/from sit;Rolling;Bridging;Modified independent   PT: Transfers Modified independent;Sit to/from stand;Assistive device   PT: Gait Modified independent;Rolling walker;50 feet   PT: Stairs Minimal assist;6 stairs;Rail on left;Rail on right   Interventions   Interventions Quick Adds Gait Training;Therapeutic Activity;Therapeutic Procedure   Therapeutic Activity   Therapeutic Activities: dynamic activities to improve functional performance Minutes (67072) 15   Symptoms Noted During/After Treatment Increased pain;Dizziness   Treatment Detail/Skilled Intervention Pt greeted supine in bed, 2 supportive daughters present. Educated pt on importance of OOB activity as able, graded exposure to activity while balancing rest, importance of continued mobility in conjunction with acute and chronic LBP. Pt had Lumbar brace from home and rolled L/R Mod-I using bedrails with daugther helping don. Educated pt on logroll technique rolled to L side and performed supine<>sit Hortensia, help with legs in/out of bed and slight assist with trunk to upright, pt moved extremely slowly and stepwise, fearful of movement. Pt sat EOB and was able to scoot hips closer to edge IND. Performed sit<>stand with FWW CGA, cues for hand placement. Pt declining to try up in recliner at this time, educated to try for dinner and get some ambulating in [facility] nursing. Pt  left supine in bed with daugthers still present. Pt reported pain up to 7/10 during session but did not appear to be in distress at any time. Had mild light-headedness upon sitting which resolved quickly   Gait Training   Gait Training Minutes (95702) 15   Symptoms Noted During/After Treatment (Gait Training) fatigue;increased pain   Treatment Detail/Skilled Intervention Standing EOB cued for pre-gait in FWW and CGA, stepping in place for 1 min, pt moveing legs very slowly and minimally clearing floor, not bringing knees higher despite cues. Pt then amb in room ~50' with FWW and CGA, chair follow from daughter, pt moves very slowly exhibiting fear avoidance behavior feeling he might fall at any time.Pt used slow and short step-through pattern, slight trunk lean to L side tactile cues for midline shift. cues for safe walker placement.. Pt steady throughout with no overt LOB   Distance in Feet 50'   [place] Level (Gait Training) contact guard   Physical Assistance Level (Gait Training) 1 person + 1 person to manage equipment   Weight Bearing (Gait Training) full weight-bearing   Assistive Device (Gait Training) rolling walker   PT Discharge Planning   PT Plan progress bed mobility reviewing logroll, progress gait with FWW and chair follow, trial stairs as able   PT Discharge Recommendation (DC Rec) home with assist;home with home care physical therapy;Leaving home requires significant taxing effort   PT Rationale for DC Rec Pt is below baseline of being IND with all mobility and ADL's. Limited by pain and fear avoidance behavior at this time, pt was able to ambulate in room 50' with FWW and chair follow. Anticipate with continued medical managment and progressiong in therapy pt can return home with assist from very supportive family, recommend pt use FWW at this time for ambulating due ot hx of legs giving out. Pt will benefit from continued therapy in home setting to progress independence and safety with all  mobility   PT Brief overview of current status bed mobility Hortensia, STS with FWW CGA, amb with FWW CGA   PT Equipment Needed at Discharge walker, rolling   Total Session Time   Timed Code Treatment Minutes 30   Total Session Time (sum of timed and untimed services) 40

## 2024-02-05 NOTE — CONSULTS
"[facility]    Neurosurgery Consultation     Date of Admission:  2/4/2024  Date of Consult (When I saw the patient): 02/05/24    Assessment & Plan   Yordy Presley is a 48 year old male with history of DVT.  Chronic iron deficiency anemia, constipation, hemorrhoids who was admitted on 2/4/2024 with acute on chronic low back pain. Neurosurgery was consulted for \" acute back pain\". Radiographic findings include:    Lumbar MRI scan from 2/5/2024 reviewed  IMPRESSION:  1.  At L1-L2 there is a moderate-sized left central disc extrusion which causes moderate to severe left lateral recess stenosis and possible impingement of the traversing left L2 nerve root. Findings at this level have progressed.  2.  Stable postoperative changes of discectomy and fusion at L4-L5 and L5-S1 with posterior instrumented fusion at these levels, decompressive laminectomies, left L4-L5 facetectomy and bilateral facetectomy at L5-S1.         SI joint MRI from 2/5/2024 reviewed  IMPRESSION:  1.  No evidence for sacral fracture or edema. Normal SI joints.   2.  Partial visualization of right L5-S1 posterior spinal fusion hardware producing artifact.    Plan:  -Continue with conservative measures including medications, PT/OT.  No urgent neurosurgical intervention indicated at this time.  -Start Decadron 4 mg Q8.  If beneficial could consider discharging on MDP.  -Could consider JOHN if medication and time do not improve symptoms.    I have discussed the following assessment and plan with Dr. Mitchell.     Isabelle Santiago PA-C  [facility] Neurosurgery  [address] [address]    Code Status    Full Code    Reason for Consult   Reason for consult: I was asked by hospitalist to evaluate this patient for acute low back pain.    Primary Care Physician   [facility]    Chief Complaint   Low back pain    History is obtained from the " patient    History of Present Illness   Yordy Presley is a 48 year old male who presents with acute on chronic low back pain.  Patient states he has been struggling with low back and left lower extremity pain for years.  He did have a previous L4-S1 fusion completed at [facility] about 10 to 15 years ago.  He reports that he had left lower extremity symptoms prior to surgery, and surgery did not improve symptoms.  He has had extensive workup in regards to left lower extremity symptoms including removal of lumbar instrumentation on the left, EMG studies, lumbar JOHN's, physical therapy, medications without significant benefits.  Left lower extremity pain is down the posterior aspect of the leg all the way to the left heel.  He also had spinal cord stimulator trial last year but did not go through with it due to failure of trial.  He reports that pain acutely increased in the low back yesterday when he was leaning forward.  Now he also has pain down posterior aspect of right lower extremity.  He also reports pain in the left groin.  He denies any increasing weakness in his extremities but he notes that ambulation is extremely painful due to acuity of increasing pain.  No bowel or bladder dysfunction.  He does follow with [facility], last about 1 year ago.    Past Medical History   I have reviewed this patient's medical history and updated it with pertinent information if needed.   Past Medical History:   Diagnosis Date    NO ACTIVE PROBLEMS        Past Surgical History   I have reviewed this patient's surgical history and updated it with pertinent information if needed.  Past Surgical History:   Procedure Laterality Date    BACK SURGERY      BACK SURGERY      fusion - 6- 8 yrs ago    TONSILLECTOMY      WRIST SURGERY         Prior to Admission Medications   Prior to Admission Medications   Prescriptions Last Dose Informant Patient Reported? Taking?   HYDROcodone-acetaminophen (NORCO) 5-325 MG tablet   Yes Yes   Sig: Take 1-2  tablets by mouth every 6 hours as needed for severe pain   acetaminophen (TYLENOL) 325 MG tablet PRN  Yes Yes   Sig: Take 650 mg by mouth every 4 hours as needed    cyclobenzaprine (FLEXERIL) 10 MG tablet PRN  Yes Yes   Sig: Take 10 mg by mouth 3 times daily as needed for muscle spasms   diazepam (VALIUM) 10 MG tablet Not Taking  No No   Sig: Take 1 tablet (10 mg) by mouth every 6 hours as needed for muscle spasms   Patient not taking: Reported on 2/5/2024   olopatadine (PATANOL) 0.1 % ophthalmic solution PRN  Yes Yes   Sig: Place 1 drop into both eyes 2 times daily as needed for allergies   omeprazole (PRILOSEC) 20 MG DR capsule 2/4/2024  No Yes   Sig: Take 1 capsule (20 mg) by mouth 2 times daily   polyethylene glycol (MIRALAX) 17 g packet PRN  Yes Yes   Sig: Take 17 g by mouth 2 times daily as needed for constipation   senna-docusate (SENOKOT-S/PERICOLACE) 8.6-50 MG tablet 2/4/2024  No Yes   Sig: Take 1 tablet by mouth 2 times daily as needed for constipation      Facility-Administered Medications: None     Allergies   Allergies   Allergen Reactions    Oxycodone GI Disturbance and Nausea and Vomiting       Social History   I have reviewed this patient's social history and updated it with pertinent information if needed. Yordy Presley  reports that he has never smoked. He has never used smokeless tobacco. He reports that he does not drink alcohol and does not use drugs.    Family History   I have reviewed this patient's family history and updated it with pertinent information if needed.   Family History   Problem Relation Age of Onset    Lymphoma Father 85    Cancer Father     Colon Cancer Father        Review of Systems   10 point ROS negative other than symptoms noted in HPI.    Physical Exam   Temp: 98.1  F (36.7  C) Temp src: Oral BP: 121/66 Pulse: 89   Resp: 16 SpO2: 97 % O2 Device: None (Room air)    Vital Signs with Ranges  Temp:  [97.9  F (36.6  C)-98.5  F (36.9  C)] 98.1  F (36.7  C)  Pulse:  [71-89]  "89  Resp:  [16] 16  BP: (118-131)/(44-76) 121/66  SpO2:  [97 %-100 %] 97 %  195 lbs 0 oz     , Blood pressure 121/66, pulse 89, temperature 98.1  F (36.7  C), temperature source Oral, resp. rate 16, height 1.905 m (6' 3\"), weight 88.5 kg (195 lb), SpO2 97%.  195 lbs 0 oz  HEENT:  Normocephalic, atraumatic  Heart:  No peripheral edema  Lungs:  No SOB  Skin:  Warm and dry, good capillary refill.    NEUROLOGICAL EXAMINATION:   Mental status:  Alert and Oriented x 3, speech is fluent.  Moves very gingerly  Cranial nerves:  II-XII intact.   Motor:  Strength is 5/5 throughout the upper and lower extremities  Shoulder Abduction  Right:  5/5   Left:  5/5  Biceps                      Right:  5/5   Left:  5/5  Triceps                     Right:  5/5   Left:  5/5  Wrist Extensors        Right:  5/5   Left:  5/5  Wrist Flexors            Right:  5/5   Left:  5/5  Intrinsics                   Right:  5/5   Left:  5/5    Iliopsoas  (hip flexion)               Right: 5/5  Left:  5/5  Quadriceps  (knee extension)       Right:  5/5  Left:  5/5  Hamstrings  (knee flexion)            Right:  5/5  Left:  5/5  Gastroc Soleus  (PF)                          Right:  5/5  Left:  5/5  Tibialis Ant  (DF)                          Right:  5/5  Left:  5/5  EHL                          Right:  5/5  Left:  5/5    Sensation:  Intact to light touch   Reflexes:   Negative Clonus.  Negative Herndon's sign.     Data     CBC RESULTS:   Recent Labs   Lab Test 02/04/24  2210   WBC 6.5   RBC 3.56*   HGB 8.8*   HCT 28.6*   MCV 80   MCH 24.7*   MCHC 30.8*   RDW 18.5*        Basic Metabolic Panel:  Lab Results   Component Value Date     02/04/2024     06/22/2021      Lab Results   Component Value Date    POTASSIUM 4.0 02/04/2024    POTASSIUM 4.2 06/22/2021     Lab Results   Component Value Date    CHLORIDE 102 02/04/2024    CHLORIDE 105 06/22/2021     Lab Results   Component Value Date    MARISOL 9.3 02/04/2024    MARISOL 9.1 06/22/2021     Lab " Results   Component Value Date    CO2 26 02/04/2024    CO2 27 06/22/2021     Lab Results   Component Value Date    BUN 17.5 02/04/2024    BUN 13 06/22/2021     Lab Results   Component Value Date    CR 0.79 02/04/2024    CR 0.80 06/22/2021     Lab Results   Component Value Date     02/04/2024     02/01/2024     06/22/2021     INR:  Lab Results   Component Value Date    INR 1.20 01/31/2024

## 2024-02-05 NOTE — PROVIDER NOTIFICATION
"MD Notification    Notified Person: MD    Notified Person Name: Remington    Notification Date/Time: 2/5/24; 1105    Notification Interaction: vocera    Purpose of Notification: can you please change \" spine surgery\" consult to \" neurosurgery\" if you would like them to see this patient? TY\"     Orders Received: awaiting    Comments:   "

## 2024-02-05 NOTE — PHARMACY-ADMISSION MEDICATION HISTORY
"Pharmacist Admission Medication History    Admission medication history is complete. The information provided in this note is only as accurate as the sources available at the time of the update.    Information Source(s): Patient and CareEverywhere/SureScripts via in-person    Pertinent Information: None    Changes made to PTA medication list:  Added: cyclobenzaprine, hydrocodone/acetaminophen, miralax  Deleted: None  Changed: Marked diazepam as \"not taking\" - there is no recent fill HX on this medication      Allergies reviewed with patient and updates made in EHR:  Done by RN    Medication History Completed By: Hannah Thomas RPH 2/5/2024 8:52 AM    PTA Med List   Medication Sig Last Dose    acetaminophen (TYLENOL) 325 MG tablet Take 650 mg by mouth every 4 hours as needed  PRN    cyclobenzaprine (FLEXERIL) 10 MG tablet Take 10 mg by mouth 3 times daily as needed for muscle spasms PRN    HYDROcodone-acetaminophen (NORCO) 5-325 MG tablet Take 1-2 tablets by mouth every 6 hours as needed for severe pain     olopatadine (PATANOL) 0.1 % ophthalmic solution Place 1 drop into both eyes 2 times daily as needed for allergies PRN    omeprazole (PRILOSEC) 20 MG DR capsule Take 1 capsule (20 mg) by mouth 2 times daily 2/4/2024    polyethylene glycol (MIRALAX) 17 g packet Take 17 g by mouth 2 times daily as needed for constipation PRN    senna-docusate (SENOKOT-S/PERICOLACE) 8.6-50 MG tablet Take 1 tablet by mouth 2 times daily as needed for constipation 2/4/2024       "

## 2024-02-05 NOTE — ED NOTES
"[facility]  ED Nurse Handoff Report    ED Chief complaint: Back Pain      ED Diagnosis:   Final diagnoses:   None       Code Status: to be addressed by provider    Allergies:   Allergies   Allergen Reactions    Oxycodone GI Disturbance and Nausea and Vomiting       Patient Story: Has chronic low back pain that is worse today since 1300.  Focused Assessment:  He was seen at  Friday for same symptoms and sent home with pain medications that are not helping. He was recently hospitalized for anemia and has follow scheduled. His pain is worse and having intermittent dizziness, SOB, and palpitations with exertion.     Treatments and/or interventions provided: IV, labs, UA  Patient's response to treatments and/or interventions: pain continues    To be done/followed up on inpatient unit:  pain control    Does this patient have any cognitive concerns?:  none    Activity level - Baseline/Home:  Independent  Activity Level - Current:   Stand with Assist    Patient's Preferred language: English   Needed?: No    Isolation: None  Infection: Not Applicable  Patient tested for COVID 19 prior to admission: NO  Bariatric?: No    Vital Signs:   Vitals:    02/04/24 2056 02/04/24 2300   BP: 131/44    Pulse: 83    Resp: 16    Temp: 98.3  F (36.8  C)    TempSrc: Temporal    SpO2: 100% 97%   Weight: 88.5 kg (195 lb)    Height: 1.905 m (6' 3\")        Cardiac Rhythm:     Was the PSS-3 completed:   Yes  What interventions are required if any?               Family Comments: family member at bedside  OBS brochure/video discussed/provided to patient/family: No              Name of person given brochure if not patient:               Relationship to patient:     For the majority of the shift this patient's behavior was Green.   Behavioral interventions performed were .    ED NURSE PHONE NUMBER: [phone]         "

## 2024-02-05 NOTE — H&P
Assessment/Plan    48, hx of anemia, constipation, LBP presenting with acute LBP.     Acute on chronic Lumbar pain  Hx of Lumbar DJD w/ Radiculopathy  ``Hx: Patient presenting with 1 day of acute R sided back pain brought on by bending down, with inability to ambulate, No fall. Patient states he has chronic back pain radiating to L leg (all the way to the heel), need a fusion procedure 15-20yrs ago. Following the procedure, the back pain improved with occasinoal severe flare ups, however the L leg pain did not improve. Surgeons removed L sided hardware in the hopes that the L leg pain would improve, but pain persists, and is exacerbated with stnding straight and sleeping flat. Went to  this week for L leg pain management, and received oxy. Patient denies active L leg pain, and states that his current episode is associated with R back pain radiating to R thigh/ Patient states EMG in the past has been positive on the L leg. Pain has a trial of nerve stimulation, but permanent implantation was not performed as the trial failed to show enough improvement. Stopped lyrica due to drowsiness, and states gabapentin no longer helps. Last time patient had similar flair was 1 year ago  ``Vitals/Exam: Exam shows R>L lumbar paraspinal tenderness, with no midline tenderness. Straight leg test negative bilatearlly, but did cause exacerbation of R back pain and R thigh pain. Vitally stable  ``ER Course: lidocaine patch, dilaudid 0.5, valium 5, decadron 10mg  --Spine consult  --PT OT  --Tylenol standing, PRN oxy. Standing flixirl  --MRI L/S w/wo contrast due to surgical intervention in the last 10 yrs.     Chronic Iron Deficiency Anemia  ``recent [facility] hospitlisation for symptomatic anemia requiring venofer and RBC transfusion. No active bleeding, pending outpt GI endoscopy  ``Hb improving  --Iron supplementation  --resumed PPI q12    Hx of DVT  ``completed months of AC  ``Dopplers at [facility] this past week negative for DVT  --no  "acute internvetion, SCDs    Hx of Constipation  Hx of Hemorroids  --miralax/senna    Supportive Care  DVT ppx: SCDs  Diet: regular  Pain Control:tylenol  Upper GI ppx: zofran  Lower GI ppx: senna  Lines/Catheters: IV  TTE/Dopplers: none  Contact Precautions: none  PT/OT/Social/Wound/Palliative Consults: PT OT  Code Status: Full    History of Present Illness  Subjective: Patient presenting with 1 day of acute R sided back pain brought on by bending down, with inability to ambulate, No fall. Patient states he has chronic back pain radiating to L leg (all the way to the heel), need a fusion procedure 15-20yrs ago. Following the procedure, the back pain improved with occasinoal severe flare ups, however the L leg pain did not improve. Surgeons removed L sided hardware in the hopes that the L leg pain would improve, but pain persists, and is exacerbated with stnding straight and sleeping flat. Went to  this week for L leg pain management, and received oxy. Patient denies active L leg pain, and states that his current episode is associated with R back pain radiating to R thigh/ Patient states EMG in the past has been positive on the L leg. Pain has a trial of nerve stimulation, but permanent implantation was not performed as the trial failed to show enough improvement. Stopped lyrica due to drowsiness, and states gabapentin no longer helps. Last time patient had similar flair was 1 year ago    ROS: 10 point ROS neg other than the symptoms noted above in the HPI.     Physical Exam  /44   Pulse 83   Temp 98.3  F (36.8  C) (Temporal)   Resp 16   Ht 1.905 m (6' 3\")   Wt 88.5 kg (195 lb)   SpO2 99%   BMI 24.37 kg/m      Constitutional: Alert and oriented to person, place and time; no apparent distress.   HEENT: Normocephalic, no scleral icterus  Abdomen: soft, no distention/tenderness/guarding  Lungs: lungs clear to auscultation bilaterally  Heart: Regular s1s2, no evidence of murmurs  Extremities/Neuro:No focal " strength/sensation deficits, no LE edema. R>L lumbar paraspinal tenderness, with no midline tenderness. Straight leg test negative bilatearlly, but did cause exacerbation of R back pain and R thigh pain.   Skin: No obvious signs of skin breakdown  Psychiatric: appropriate affect, insight and judgment    I have personally spent 88 minutes total time today in preparing to see the patient (eg, review of tests), obtaining and/or reviewing separately obtained history, performing a medically appropriate examination and/or evaluation, counseling and educating the patient/family/caregiver, ordering medications, tests, or procedures, referring and communicating with other health care professionals, documenting clinical information in the electronic or other health record, independently interpreting results and communicating results to the patient/ family/caregiver and care coordination.    EMR History    Past Medical Hx:   Past Medical History:   Diagnosis Date    NO ACTIVE PROBLEMS         Home Medications: Present in Med Rec    Allergies:   Allergies   Allergen Reactions    Oxycodone GI Disturbance and Nausea and Vomiting        Pertinent Family Hx:   Family History   Problem Relation Age of Onset    Lymphoma Father 85    Cancer Father     Colon Cancer Father         Surgical Hx:   Past Surgical History:   Procedure Laterality Date    BACK SURGERY      BACK SURGERY      fusion - 6- 8 yrs ago    TONSILLECTOMY      WRIST SURGERY          Substance Hx:   History   Drug Use No   ,  Social History    Substance and Sexual Activity      Alcohol use: No  ,   History   Smoking Status    Never   Smokeless Tobacco    Never   ,   History   Sexual Activity    Sexual activity: Yes        Imaging/Labs    Imaging: No results found.     Recent Labs   Lab Test 02/04/24  2210 02/01/24  0859 02/01/24  0742     --  136   POTASSIUM 4.0  --  4.2   CHLORIDE 102  --  106   CO2 26  --  22   ANIONGAP 8  --  8   * 102* 101*   BUN 17.5   "--  10.7   CR 0.79  --  0.78   MARISOL 9.3  --  8.5*     CBC RESULTS:   Recent Labs   Lab Test 02/04/24  2210   WBC 6.5   RBC 3.56*   HGB 8.8*   HCT 28.6*   MCV 80   MCH 24.7*   MCHC 30.8*   RDW 18.5*         Liver Function Studies -   Recent Labs   Lab Test 02/04/24  2210   PROTTOTAL 7.7   ALBUMIN 4.7   BILITOTAL 0.2   ALKPHOS 63   AST 47*   ALT 42      No results found for: \"TROPI\"       "

## 2024-02-05 NOTE — ED TRIAGE NOTES
Pt c/o low back pain 1300 today. Pt has Hx of chronic back pain. Pt seen at  on Friday for same symptoms and sent home with pain medication. Pt recently discharged. Pt discharged from [facility] on 1/31 after being admitted for anemia. Pt has appt to see primary on Wednesday for follow up and to schedule an endoscopy. Pt reports since discharge he has had intermittent dizziness, SOB, and palpitations when he exerts himself. Pt denies dizziness, CP, and SOB at this time. Pt would like his Hgb checked today.      Triage Assessment (Adult)       Row Name 02/04/24 2100          Triage Assessment    Airway WDL WDL        Respiratory WDL    Respiratory WDL WDL        Cardiac WDL    Cardiac WDL WDL        Cognitive/Neuro/Behavioral WDL    Cognitive/Neuro/Behavioral WDL WDL

## 2024-02-05 NOTE — ED PROVIDER NOTES
History     Chief Complaint:  Back Pain     HPI   Yordy Presley is a 48 year old male with history of lumbar radiculopathy, lumbosacral spondylosis, and lumbar DJD who presents for evaluation of back pain. He reports that he has midline back pain as well as right sided sciatic pain that flared around 1100 this morning. He states that his flare up happened while he was bending over in his back yard and he has had trouble standing upright without assistance since then. He notes that the pain feels like a pressure in his back as well as the shooting pain down his right leg. He adds that he does not have this severe pain at baseline, but will get flare ups with certain activities or positions. He notes that he was prescribed oxycodone after an urgent care visit a couple of days ago and took one pill today. He reports that his pain today is similar to prior flare ups that required hospitalization. He denies numbness, tingling, true muscle weakness, urinary or bowel incontinence, fever, dysuria, hematuria. He notes that he had a low hemoglobin a couple of days ago but has not had any blood in his stools or black, tarry stools.    Independent Historian:   None - Patient Only    Review of External Notes:   I reviewed [facility] discharge summary from 2/1/24.  I reviewed urgent care note from 2/2/24.  I reviewed PDMP.     Medications:    Diazepam  Omeprazole  Senna  Norco   Gabapentin  Cyclobenzaprine    Past Medical History:    Hemorrhoids  GERD  Lumbar radiculopathy  Symptomatic anemia   DVT  DJD, lumbar  Lumbosacral spondylosis    Past Surgical History:    Spinal fusion  Tonsillectomy  Wrist surgery      Physical Exam   Patient Vitals for the past 24 hrs:   BP Temp Temp src Pulse Resp SpO2 Height Weight   02/05/24 0009 -- -- -- -- -- 98 % -- --   02/05/24 0005 122/76 -- -- 80 -- 98 % -- --   02/04/24 2329 -- -- -- -- -- 99 % -- --   02/04/24 2300 -- -- -- -- -- 97 % -- --   02/04/24 2056 131/44 98.3  F  "(36.8  C) Temporal 83 16 100 % 1.905 m (6' 3\") 88.5 kg (195 lb)      Physical Exam  Nursing note and vitals reviewed.  Constitutional:  Oriented to person, place, and time. Cooperative.  Appears uncomfortable.  HENT:   Nose:    Nose normal.   Mouth/Throat:   Mucous membranes are normal.   Eyes:    Conjunctivae normal and EOM are normal.      Pupils are equal, round, and reactive to light.   Neck:    Trachea normal.   Cardiovascular:  Normal rate, regular rhythm, normal heart sounds and normal pulses. No murmur heard.  Pulmonary/Chest:  Effort normal and breath sounds normal.   Abdominal:   Soft. Normal appearance and bowel sounds are normal.      There is no tenderness.      There is no rebound and no CVA tenderness.   Musculoskeletal:  Some tenderness to palpation to the low back region and on the right low back region in the sciatic notch region extremities atraumatic x 4.   Lymphadenopathy:  No cervical adenopathy.   Neurological:   Alert and oriented to person, place, and time. Normal strength.      No cranial nerve deficit or sensory deficit. GCS eye subscore is 4. GCS verbal subscore is 5. GCS motor subscore is 6. 5/5 strength in all muscle groups of the lower extremities.  Sensation intact to light touch distally. DTRs equal and symmetric in the lower extremities. Straight leg raises negative bilaterally.  Skin:    Skin is intact. No rash noted.   Psychiatric:   Normal mood and affect.    Emergency Department Course   Laboratory:  Labs Ordered and Resulted from Time of ED Arrival to Time of ED Departure   COMPREHENSIVE METABOLIC PANEL - Abnormal       Result Value    Sodium 136      Potassium 4.0      Carbon Dioxide (CO2) 26      Anion Gap 8      Urea Nitrogen 17.5      Creatinine 0.79      GFR Estimate >90      Calcium 9.3      Chloride 102      Glucose 100 (*)     Alkaline Phosphatase 63      AST 47 (*)     ALT 42      Protein Total 7.7      Albumin 4.7      Bilirubin Total 0.2     ROUTINE UA WITH " MICROSCOPIC REFLEX TO CULTURE - Abnormal    Color Urine Light Yellow      Appearance Urine Clear      Glucose Urine Negative      Bilirubin Urine Negative      Ketones Urine Negative      Specific Gravity Urine 1.021      Blood Urine Negative      pH Urine 7.0      Protein Albumin Urine Negative      Urobilinogen Urine Normal      Nitrite Urine Negative      Leukocyte Esterase Urine Negative      Mucus Urine Present (*)     RBC Urine 0      WBC Urine 1     CBC WITH PLATELETS AND DIFFERENTIAL - Abnormal    WBC Count 6.5      RBC Count 3.56 (*)     Hemoglobin 8.8 (*)     Hematocrit 28.6 (*)     MCV 80      MCH 24.7 (*)     MCHC 30.8 (*)     RDW 18.5 (*)     Platelet Count 258      % Neutrophils 80      % Lymphocytes 12      % Monocytes 6      % Eosinophils 1      % Basophils 1      % Immature Granulocytes 0      NRBCs per 100 WBC 0      Absolute Neutrophils 5.3      Absolute Lymphocytes 0.8      Absolute Monocytes 0.4      Absolute Eosinophils 0.0      Absolute Basophils 0.0      Absolute Immature Granulocytes 0.0      Absolute NRBCs 0.0     TYPE AND SCREEN, ADULT    ABO/RH(D) O POS      Antibody Screen Negative      SPECIMEN EXPIRATION DATE [id number]     ABO/RH TYPE AND SCREEN      Emergency Department Course & Assessments:     Interventions:  Medications   HYDROmorphone (PF) (DILAUDID) injection 0.5 mg (0.5 mg Intravenous $Given 2/4/24 2206)   Lidocaine (LIDOCARE) 4 % Patch 1 patch (1 patch Transdermal $Patch/Med Applied 2/4/24 2215)   acetaminophen (TYLENOL) tablet 650 mg (650 mg Oral $Given 2/5/24 0009)   cyclobenzaprine (FLEXERIL) tablet 10 mg (has no administration in time range)   oxyCODONE (ROXICODONE) tablet 5 mg (has no administration in time range)   oxyCODONE (ROXICODONE) tablet 10 mg (has no administration in time range)   senna-docusate (SENOKOT-S/PERICOLACE) 8.6-50 MG per tablet 1 tablet (has no administration in time range)     Or   senna-docusate (SENOKOT-S/PERICOLACE) 8.6-50 MG per tablet 2  tablet (has no administration in time range)   ondansetron (ZOFRAN ODT) ODT tab 4 mg (has no administration in time range)     Or   ondansetron (ZOFRAN) injection 4 mg (has no administration in time range)   ferrous sulfate (FEROSUL) tablet 325 mg (has no administration in time range)   sennosides (SENOKOT) tablet 8.6 mg (has no administration in time range)   polyethylene glycol (MIRALAX) Packet 17 g (has no administration in time range)   pantoprazole (PROTONIX) EC tablet 40 mg (has no administration in time range)   diazepam (VALIUM) tablet 5 mg (5 mg Oral $Given 2/4/24 2200)   dexAMETHasone PF (DECADRON) injection 10 mg (10 mg Intravenous $Given 2/5/24 0006)      Assessments:  2138 I obtained history and examined the patient as noted above.     Independent Interpretation (X-rays, CTs, rhythm strip):  None    Consultations/Discussion of Management or Tests:  I spoke with Dr. Montano of the hospitalist service regarding admission.         Social Determinants of Health affecting care:   None    Disposition:  The patient was admitted to the hospital under the care of Dr. Montano.     Impression & Plan    Medical Decision Making:  This is a 48-year-old male who came in for further evaluation of low back pain and what sounds like a lumbar radiculopathy or sciatica.  He does not have any worrisome symptoms or exam findings for cauda equina syndrome or discitis.  Therefore I do not feel that he requires emergent imaging with MRI or CT.  However he was extremely uncomfortable here despite being provided the above interventions.  He is barely able to even sit forward in the bed, and he does not feel that he is able to go home.  Of note, his hemoglobin also has improved which is reassuring.  I subsequently spoke with Dr. Montano, who will be taking care of him.      Diagnosis:    ICD-10-CM    1. Lumbar radiculopathy  M54.16          Scribe Disclosure:  I, No Zafar, am serving as a scribe at 9:24 PM on  2/4/2024 to document services personally performed by Nabil Chandler MD based on my observations and the provider's statements to me.     2/4/2024   Nabil Chandler MD Lashkowitz, Seth H, MD  02/05/24 0108

## 2024-02-05 NOTE — PROGRESS NOTES
2/5/24; [id number]    A&O X4; A1 gb/walker to chair and with PT; pain managed with scheduled Tylenol, Flexeril, and PRN Oxy; Decadron started; T-pump while in bed is somewhat effective; plan for therapeutic treatment, with no surgery. Discharge when pain is well managed and symptoms subside.

## 2024-02-05 NOTE — PROGRESS NOTES
[facility]    Medicine Progress Note - Hospitalist Service       Date of Admission:  2/4/2024    Assessment & Plan   Yordy Presley is a 48 year old male with hx of chronic low back pain s/p multiple spine surgeries and prior DVT no longer on anticoagulation who was admitted on 2/4/2024 for acute on chronic back pain    Presented with 1 week history of progressive low back pain with acute worsening on the day of presentation after bending over with limited mobility and inability to ambulate. Denies fall. Was seen a week prior in urgent care, and was discharged with PRN oxycodone.     Has chronic back pain radiating to L leg (all the way to the heel) with remote history of left-sided lumbar fusion 15-20yrs ago. Following the procedure, the back pain improved with occasinoal severe flare ups, however the left leg pain did not improve. Surgeons subsequently removed hardware in the hopes that the left leg pain would improve, but pain persists, and is exacerbated with stnding straight and sleeping flat. Has trialed nerve stimulation, but permanent implantation was not performed as the trial failed to show enough improvement. Has taken gabapentin without relief; pregabalin causes drowsiness      Acute on chronic low back pain  Hx of lumbar radiculopathy s/p lumbar fusion   *Neurosurgery consulted on admission  *No red flag s/sx  - Current pain regimen:  - APAP 975 mg TID  - lidocaine patch  - Flexeril 10 mg TID   - Oxycodone 5-10 mg q4h prn   - Heating pad prn  - Dexamethasone 4 mg q8h ordered per Neurosurgery today, 2/5  - PT consult  - Formal Neurosurgery consult pending    Constipation  *Bowel regimen ordered    Chronic iron deficiency anemia  *Recently hospitalized at [facility] (1/31-2/1/24) for symptomatic anemia requiring transfusion and also treated with IV iron. Empirically initiated on PPI BID and discharged with plan for outpatient EGD. Hgb 6.9 on that admission, 7.8 at discharge  *Hgb  8.8 on this admission  - Continues on PTA PPI    Hx of DVT  *No longer on anticoagulation  *BLE doppler US during recent hospitalization negative for DVT     Diet: Regular Diet Adult    DVT Prophylaxis: Pneumatic Compression Devices  [place] Catheter: Not present  Code Status: Full Code         Disposition: Expected discharge pending adequate pain control and Neurosurgery consult, as early as tomorrow if no intervention planned and pain improves    The patient's care was discussed with the Bedside Nurse, Patient, and Patient's Family.    Cece Macedo MD  Hospitalist Service  [facility]  Contact information available via [facility] Paging/Directory    ______________________________________________________________________    Interval History   Admitted last night. Denies pain while lying down, but reproduced with minimal movement. Denies numbness/tingling, focal weakness, or bowel/bladder incontinence. Awaiting Neurosurgery input. +constipation    Data reviewed today: I reviewed all medications, new labs and imaging results over the last 24 hours. I personally reviewed no images or EKG's today.    Physical Exam   Vital Signs: Temp: 98.1  F (36.7  C) Temp src: Oral BP: 121/66 Pulse: 89   Resp: 16 SpO2: 97 % O2 Device: None (Room air)    Weight: 195 lbs 0 oz  Constitutional: Resting in bed, NAD  HEENT: Sclera white, MMM  Respiratory: Breathing non-labored. Lungs CTAB - no wheezes, crackles, or rhonchi  Cardiovascular: Heart RRR, no m/r/g. No pedal edema  GI: +BS, abd soft/NT  Skin/Integument: No rash  Musculoskeletal: Normal muscle bulk and tone  Neuro: Alert and appropriate, LION  Psych: Calm and cooperative    Data   Recent Labs   Lab 02/04/24  2210 02/01/24  1403 02/01/24  0859 02/01/24  0742 02/01/24  0011 01/31/24  1518   WBC 6.5  --   --  5.9 4.9 4.3   HGB 8.8* 7.8*  --  7.7* 6.9* 6.2*   MCV 80  --   --  79 79 77*     --   --  230 249 259   INR  --   --   --   --   --  1.20*   NA  136  --   --  136  --  138   POTASSIUM 4.0  --   --  4.2  --  3.8   CHLORIDE 102  --   --  106  --  103   CO2 26  --   --  22  --  25   BUN 17.5  --   --  10.7  --  11.2   CR 0.79  --   --  0.78  --  0.77   ANIONGAP 8  --   --  8  --  10   MARISOL 9.3  --   --  8.5*  --  9.1   *  --  102* 101*  --  102*   ALBUMIN 4.7  --   --  4.0  --  4.8   PROTTOTAL 7.7  --   --  6.6  --  7.4   BILITOTAL 0.2  --   --  0.4  --  0.4   ALKPHOS 63  --   --  55  --  61   ALT 42  --   --  30  --  31   AST 47*  --   --  38  --  40         Recent Results (from the past 24 hour(s))   MR Lumbar Spine w/o & w Contrast    Narrative    EXAM: MR LUMBAR SPINE W/O and W CONTRAST  LOCATION: [facility]  DATE: 2/5/2024    INDICATION: Sudden onset lower back pain with radiculopathy.  COMPARISON: MRI 06/22/2021.  CONTRAST: 9 Gadavist.  TECHNIQUE: Routine Lumbar Spine MRI without and with IV contrast.    FINDINGS:   Nomenclature is based on 5 lumbar type vertebral bodies. Vertebral body height is normal. No pathologic marrow signal or enhancement. There is mild grade I retrolisthesis of L1 on L2. Interbody fusion at L4-L5 and L5-S1. Mild grade I anterolisthesis of   L5 on S1. Posterior instrumented fusion from L4 through S1 with the hardware on the right side. No hardware on the left side. Decompressive laminectomy at L4-L5 and L5-S1. Normal distal spinal cord and cauda equina with conus medullaris at L1. No   extraspinal abnormality. Unremarkable visualized bony pelvis.    T12-L1: Normal disc height and signal. No herniation. Normal facets. No spinal canal or neural foraminal stenosis.     L1-L2: Mild loss of disc height with disc desiccation. Moderate left central extrusion causes moderate to severe left lateral recess stenosis and possible impingement of the traversing left L2 nerve root. Mild central canal stenosis. Foramina are patent.   Normal facets. Findings have progressed.    L2-L3: Mild grade I retrolisthesis of  L2. Disc desiccation. Mild annular disc bulge. Mild facet arthropathy. No central canal or neural foraminal stenosis. Findings have progressed.    L3-L4: Normal disc height and signal. No herniation. Mild to moderate facet arthropathy. No spinal canal or neural foraminal stenosis. Findings at this level are stable.    L4-L5: Interbody fusion. No herniation. Mild right facet arthropathy. Left hemilaminectomy. Left-sided facetectomy. No central canal or neural foraminal stenosis.    L5-S1: Interbody fusion. No herniation. Decompressive laminectomy and left facetectomy. Right facetectomy. No central canal or neural foraminal stenosis.      Impression    IMPRESSION:  1.  At L1-L2 there is a moderate-sized left central disc extrusion which causes moderate to severe left lateral recess stenosis and possible impingement of the traversing left L2 nerve root. Findings at this level have progressed.  2.  Stable postoperative changes of discectomy and fusion at L4-L5 and L5-S1 with posterior instrumented fusion at these levels, decompressive laminectomies, left L4-L5 facetectomy and bilateral facetectomy at L5-S1.                    MR Sacroilliac Joints wwo Contrast    Narrative    EXAM: MR SACROILLIAC JOINTS W/O and W CONTRAST  LOCATION: [facility]  DATE: 2/5/2024    INDICATION: sudden onset lower back pain with radiculopathy  COMPARISON: MRI lumbar 06/22/2021  TECHNIQUE: Routine. Additional postgadolinium T1 sequences were obtained.  IV CONTRAST: 9 Gadavist    FINDINGS:     SI JOINTS AND BONES:  -No effusion, synovitis, or erosive change. No evidence of fracture. Partial visualization of lumbosacral spinal hardware posterior liliana and screw fixation of right L5-S1, and L5-S1 interbody graft. Artifact from hardware somewhat limits evaluation,   notably, producing artifactual increased signal in the right aspect of the sacrum. Refer to separate lumbar MRI.      SOFT TISSUES:  -Small sacral perineural  cyst. No edema, mass, or fluid collection.    MUSCLES:  -Muscle bulk is normal.      Impression    IMPRESSION:  1.  No evidence for sacral fracture or edema. Normal SI joints.   2.  Partial visualization of right L5-S1 posterior spinal fusion hardware producing artifact.       Medications      acetaminophen  650 mg Oral Q6H    cyclobenzaprine  10 mg Oral TID    dexAMETHasone  4 mg Oral Q8H VIDA    ferrous sulfate  325 mg Oral Daily    pantoprazole  40 mg Oral BID    polyethylene glycol  17 g Oral Daily    sennosides  8.6 mg Oral BID

## 2024-02-05 NOTE — PROGRESS NOTES
RECEIVING UNIT ED HANDOFF REVIEW    ED Nurse Handoff Report was reviewed by: Derrick Streeter RN on February 5, 2024 at 12:02 AM

## 2024-02-05 NOTE — PROGRESS NOTES
"   02/05/24 1500   Appointment Info   Signing Clinician's Name / Credentials (PT) Yvon Edmonds DPT   Living Environment   People in Home spouse;child(jaqui), dependent  (2 daughters and 1 son)   Current Living Arrangements house   Home Accessibility stairs to enter home;stairs within home   Number of Stairs, Main Entrance 6   Stair Railings, Main Entrance railing on right side (ascending)   Number of Stairs, Within Home, Primary six   Stair Railings, Within Home, Primary railings on both sides of stairs   Transportation Anticipated family or friend will provide   Living Environment Comments Pt lives in house with spouse and 3 kids   Self-Care   Usual Activity Tolerance good   Current Activity Tolerance fair   Equipment Currently Used at Home none   Fall history within last six months no   Activity/Exercise/Self-Care Comment Pt IND with mobility and ADL's at baseline. Pt has been gettting assist from family for mobility when needed when acute back pain is present. Pt reports when he has sudden sharp pain his legs get weak and almost \"give out\" has had several near misses for falls   General Information   Onset of Illness/Injury or Date of Surgery 02/05/24   Referring Physician María Montano MD   Patient/Family Therapy Goals Statement (PT) go home   Pertinent History of Current Problem (include personal factors and/or comorbidities that impact the POC) Yordy Presley is a 48 year old male with hx of chronic low back pain s/p multiple spine surgeries and prior DVT no longer on anticoagulation who was admitted on 2/4/2024 for acute on chronic back pain   Existing Precautions/Restrictions fall   General Observations pt appears fearful of movement throughout session, 2 supportive daughters present   Cognition   Affect/Mental Status (Cognition) WFL   Orientation Status (Cognition) oriented x 4   Follows Commands (Cognition) WNL   Pain Assessment   Patient Currently in Pain Yes, see Vital Sign flowsheet  (pt reports " 6-7/10 pain/pressure when ambulating)   Integumentary/Edema   Integumentary/Edema no deficits were identifed   Posture    Posture Forward head position   Range of Motion (ROM)   Range of Motion ROM deficits secondary to pain   ROM Comment deficits with spinal ROM due to pain   Strength (Manual Muscle Testing)   Strength (Manual Muscle Testing) Able to perform R SLR;Able to perform L SLR;Deficits observed during functional mobility   Strength Comments Not formally assessed but deficits noted during mobility secondary to pain, appears grossly antigravity strength bilat UE's and LE's   Bed Mobility   Comment, (Bed Mobility) supine<>sit via logroll Hortensia   Transfers   Comment, (Transfers) sit<>stand with FWW CGA   Gait/Stairs (Locomotion)   [place] Level (Gait) contact guard   Assistive Device (Gait) walker, front-wheeled   Distance in Feet (Gait) 10'   Pattern (Gait) step-through   Deviations/Abnormal Patterns (Gait) antalgic;joi decreased;gait speed decreased;stride length decreased;weight shifting decreased   Comment, (Gait/Stairs) extremely slow but steady with FWW   Balance   Balance Comments standing balance with FWW okay, dynamic balance okay during session without pain exacerbation   Sensory Examination   Sensory Perception patient reports no sensory changes   Clinical Impression   Criteria for Skilled Therapeutic Intervention Yes, treatment indicated   PT Diagnosis (PT) impaired gait   Influenced by the following impairments pain, deficits with ROM, strength, balance   Functional limitations due to impairments bed mobility, transfer, amb, ADL's, stairs   Clinical Presentation (PT Evaluation Complexity) stable   Clinical Presentation Rationale PMH and clinical judgement   Clinical Decision Making (Complexity) low complexity   Planned Therapy Interventions (PT) balance training;bed mobility training;cryotherapy;gait training;home exercise program;patient/family education;ROM (range of motion);stair  training;strengthening;stretching;transfer training;progressive activity/exercise   Risk & Benefits of therapy have been explained evaluation/treatment results reviewed;care plan/treatment goals reviewed;risks/benefits reviewed;current/potential barriers reviewed;participants voiced agreement with care plan;participants included;patient;daughter   PT Total Evaluation Time   PT Eval, Low Complexity Minutes (41581) 10   Physical Therapy Goals   PT Frequency Daily   PT Predicted Duration/Target Date for Goal Attainment 02/08/24   PT Goals Bed Mobility;Transfers;Gait;Stairs   PT: Bed Mobility Supine to/from sit;Rolling;Bridging;Modified independent   PT: Transfers Modified independent;Sit to/from stand;Assistive device   PT: Gait Modified independent;Rolling walker;50 feet   PT: Stairs Minimal assist;6 stairs;Rail on left;Rail on right   Interventions   Interventions Quick Adds Gait Training;Therapeutic Activity;Therapeutic Procedure   Therapeutic Activity   Therapeutic Activities: dynamic activities to improve functional performance Minutes (56877) 15   Symptoms Noted During/After Treatment Increased pain;Dizziness   Treatment Detail/Skilled Intervention Pt greeted supine in bed, 2 supportive daughters present. Educated pt on importance of OOB activity as able, graded exposure to activity while balancing rest, importance of continued mobility in conjunction with acute and chronic LBP. Pt had Lumbar brace from home and rolled L/R Mod-I using bedrails with daugther helping don. Educated pt on logroll technique rolled to L side and performed supine<>sit Hortensia, help with legs in/out of bed and slight assist with trunk to upright, pt moved extremely slowly and stepwise, fearful of movement. Pt sat EOB and was able to scoot hips closer to edge IND. Performed sit<>stand with FWW CGA, cues for hand placement. Pt declining to try up in recliner at this time, educated to try for dinner and get some ambulating in [facility] nursing. Pt  left supine in bed with daugthers still present. Pt reported pain up to 7/10 during session but did not appear to be in distress at any time. Had mild light-headedness upon sitting which resolved quickly   Gait Training   Gait Training Minutes (01320) 15   Symptoms Noted During/After Treatment (Gait Training) fatigue;increased pain   Treatment Detail/Skilled Intervention Standing EOB cued for pre-gait in FWW and CGA, stepping in place for 1 min, pt moveing legs very slowly and minimally clearing floor, not bringing knees higher despite cues. Pt then amb in room ~50' with FWW and CGA, chair follow from daughter, pt moves very slowly exhibiting fear avoidance behavior feeling he might fall at any time.Pt used slow and short step-through pattern, slight trunk lean to L side tactile cues for midline shift. Pt steady throughout with no overt LOB   Distance in Feet 50'   [place] Level (Gait Training) contact guard   Physical Assistance Level (Gait Training) 1 person + 1 person to manage equipment   Weight Bearing (Gait Training) full weight-bearing   Assistive Device (Gait Training) rolling walker   PT Discharge Planning   PT Plan progress bed mobility reviewing logroll, progress gait with FWW and chair follow   PT Discharge Recommendation (DC Rec) home with assist;home with home care physical therapy;Leaving home requires significant taxing effort   PT Rationale for DC Rec Pt is below baseline of being IND with all mobility and ADL's. Limited by pain and fear avoidance behavior at this time, pt was able to ambulate in room 50' with FWW and chair follow. Anticipate with continued medical managment and progressiong in therapy pt can return home with assist from very supportive family, recommend pt use FWW at this time for ambulating due ot hx of legs giving out. Pt will benefit from continued therapy in home setting to progress independence and safety with all mobility   PT Brief overview of current status bed  mobility Hortensia, STS with FWW CGA, amb with FWW CGA   PT Equipment Needed at Discharge walker, rolling   Total Session Time   Timed Code Treatment Minutes 30   Total Session Time (sum of timed and untimed services) 40

## 2024-02-06 VITALS
OXYGEN SATURATION: 99 % | HEIGHT: 75 IN | RESPIRATION RATE: 18 BRPM | SYSTOLIC BLOOD PRESSURE: 124 MMHG | DIASTOLIC BLOOD PRESSURE: 78 MMHG | TEMPERATURE: 98.9 F | HEART RATE: 93 BPM | BODY MASS INDEX: 24.25 KG/M2 | WEIGHT: 195 LBS

## 2024-02-06 PROCEDURE — 250N000012 HC RX MED GY IP 250 OP 636 PS 637

## 2024-02-06 PROCEDURE — 99239 HOSP IP/OBS DSCHRG MGMT >30: CPT | Performed by: INTERNAL MEDICINE

## 2024-02-06 PROCEDURE — 96375 TX/PRO/DX INJ NEW DRUG ADDON: CPT

## 2024-02-06 PROCEDURE — 250N000013 HC RX MED GY IP 250 OP 250 PS 637: Performed by: STUDENT IN AN ORGANIZED HEALTH CARE EDUCATION/TRAINING PROGRAM

## 2024-02-06 PROCEDURE — 250N000011 HC RX IP 250 OP 636: Performed by: STUDENT IN AN ORGANIZED HEALTH CARE EDUCATION/TRAINING PROGRAM

## 2024-02-06 PROCEDURE — 96376 TX/PRO/DX INJ SAME DRUG ADON: CPT

## 2024-02-06 PROCEDURE — 250N000013 HC RX MED GY IP 250 OP 250 PS 637: Performed by: INTERNAL MEDICINE

## 2024-02-06 PROCEDURE — G0378 HOSPITAL OBSERVATION PER HR: HCPCS

## 2024-02-06 RX ORDER — HYDROMORPHONE HYDROCHLORIDE 2 MG/1
2-4 TABLET ORAL EVERY 6 HOURS PRN
Qty: 28 TABLET | Refills: 0 | Status: SHIPPED | OUTPATIENT
Start: 2024-02-06

## 2024-02-06 RX ORDER — ONDANSETRON 4 MG/1
4 TABLET, ORALLY DISINTEGRATING ORAL EVERY 6 HOURS PRN
Qty: 30 TABLET | Refills: 0 | Status: SHIPPED | OUTPATIENT
Start: 2024-02-06

## 2024-02-06 RX ORDER — ACETAMINOPHEN 325 MG/1
975 TABLET ORAL 3 TIMES DAILY
Qty: 100 TABLET | Refills: 0 | Status: SHIPPED | OUTPATIENT
Start: 2024-02-06

## 2024-02-06 RX ORDER — HYDROMORPHONE HYDROCHLORIDE 2 MG/1
2 TABLET ORAL
Status: DISCONTINUED | OUTPATIENT
Start: 2024-02-06 | End: 2024-02-06 | Stop reason: HOSPADM

## 2024-02-06 RX ORDER — HYDROMORPHONE HYDROCHLORIDE 2 MG/1
4 TABLET ORAL
Status: DISCONTINUED | OUTPATIENT
Start: 2024-02-06 | End: 2024-02-06 | Stop reason: HOSPADM

## 2024-02-06 RX ORDER — LIDOCAINE 4 G/G
1 PATCH TOPICAL ONCE
Status: DISCONTINUED | OUTPATIENT
Start: 2024-02-06 | End: 2024-02-06 | Stop reason: ALTCHOICE

## 2024-02-06 RX ORDER — LIDOCAINE 4 G/G
2 PATCH TOPICAL EVERY 24 HOURS
Qty: 60 PATCH | Refills: 0 | Status: SHIPPED | OUTPATIENT
Start: 2024-02-06

## 2024-02-06 RX ORDER — AMOXICILLIN 250 MG
2 CAPSULE ORAL 2 TIMES DAILY PRN
Qty: 60 TABLET | Refills: 0 | Status: SHIPPED | OUTPATIENT
Start: 2024-02-06

## 2024-02-06 RX ORDER — LIDOCAINE 4 G/G
2 PATCH TOPICAL
Status: DISCONTINUED | OUTPATIENT
Start: 2024-02-06 | End: 2024-02-06 | Stop reason: HOSPADM

## 2024-02-06 RX ORDER — CYCLOBENZAPRINE HCL 10 MG
10 TABLET ORAL 3 TIMES DAILY
Qty: 40 TABLET | Refills: 0 | Status: SHIPPED | OUTPATIENT
Start: 2024-02-06 | End: 2024-02-13

## 2024-02-06 RX ORDER — DEXAMETHASONE 2 MG/1
TABLET ORAL
Qty: 24 TABLET | Refills: 0 | Status: SHIPPED | OUTPATIENT
Start: 2024-02-06

## 2024-02-06 RX ADMIN — FERROUS SULFATE TAB 325 MG (65 MG ELEMENTAL FE) 325 MG: 325 (65 FE) TAB at 09:01

## 2024-02-06 RX ADMIN — SENNOSIDES 2 TABLET: 8.6 TABLET, FILM COATED ORAL at 09:00

## 2024-02-06 RX ADMIN — ACETAMINOPHEN 975 MG: 325 TABLET, FILM COATED ORAL at 09:00

## 2024-02-06 RX ADMIN — ONDANSETRON 4 MG: 4 TABLET, ORALLY DISINTEGRATING ORAL at 09:50

## 2024-02-06 RX ADMIN — HYDROMORPHONE HYDROCHLORIDE 2 MG: 2 TABLET ORAL at 09:52

## 2024-02-06 RX ADMIN — PANTOPRAZOLE SODIUM 40 MG: 40 TABLET, DELAYED RELEASE ORAL at 09:00

## 2024-02-06 RX ADMIN — DEXAMETHASONE 4 MG: 2 TABLET ORAL at 13:44

## 2024-02-06 RX ADMIN — CYCLOBENZAPRINE 10 MG: 10 TABLET, FILM COATED ORAL at 13:44

## 2024-02-06 RX ADMIN — POLYETHYLENE GLYCOL 3350 17 G: 17 POWDER, FOR SOLUTION ORAL at 09:01

## 2024-02-06 RX ADMIN — CYCLOBENZAPRINE 10 MG: 10 TABLET, FILM COATED ORAL at 09:00

## 2024-02-06 RX ADMIN — DEXAMETHASONE 4 MG: 2 TABLET ORAL at 06:14

## 2024-02-06 ASSESSMENT — ACTIVITIES OF DAILY LIVING (ADL)
ADLS_ACUITY_SCORE: 28
DEPENDENT_IADLS:: INDEPENDENT
ADLS_ACUITY_SCORE: 28
ADLS_ACUITY_SCORE: 28
ADLS_ACUITY_SCORE: 27

## 2024-02-06 NOTE — PLAN OF CARE
Date/shift: 02/05 1900 - 2300    Orientation: AOx4  Vitals/tele: VSS RA  Activity: Ax1 GBW  Pain: PRN Dilaudid given x1, Pt declines Oxycodone stating it makes him nauseous.   BG checks: NA  Diet: Regular  Skin: Intact  GI/: Continent, uses urinal at bedside; Complaints of constipation, scheduled senna given.   Drains/devices: PIV SL  Discharge plan: Possible discharge home tomorrow pending adequate pain control.

## 2024-02-06 NOTE — PLAN OF CARE
Goal Outcome Evaluation:    Shift/date: 2300-0730 02/05/24    Orientation: A/Ox4    Vitals/Tele: VSS RA    IV Access/drains: PIV SL    Diet:Regular    Mobility: Ax1 GBW    GI/: Continent, using urinal at bedside    Wound/Skin: Intact    Pain: Denies    Discharge Plan: Possible tomorrow pending pain control      See Flow sheets for assessment

## 2024-02-06 NOTE — PROGRESS NOTES
The patient will be discharging to home, via personal transportation, accompanied by family. He has a copy of his AVS, and attests to having all of his belongings.

## 2024-02-06 NOTE — PROGRESS NOTES
Neurosurgery progress note    Patient today states his pain is improved since starting oral steroids.  He still is having some right-sided low back pain, but is less than it was yesterday.    Exam    Alert and oriented no acute distress  Bilateral lower extremities with 5 out of 5 strength      Assessment    History of L4-S1 fusion completed at [facility] 10 to 15 years ago  Acute on chronic low back pain    Plan    I recommend the patient continue with steroids and gradually taper off over the course of a week.  He should continue with physical therapy and conservative measures.  And when his pain is well-controlled he can discharge and follow-up with his primary pain management and neurosurgery team at [facility] for further follow-up.    Patient is in agreement with the plan.    Neurosurgery will sign off at this time.

## 2024-02-06 NOTE — CONSULTS
Care Management Initial Consult    General Information  Assessment completed with: Patient, Family, Yordy, wife Reba, daughter  Type of CM/SW Visit: Initial Assessment    Primary Care Provider verified and updated as needed: Yes   Readmission within the last 30 days: no previous admission in last 30 days      Reason for Consult: discharge planning  Advance Care Planning:            Communication Assessment  Patient's communication style: spoken language (English or Bilingual)    Hearing Difficulty or Deaf: no   Wear Glasses or Blind: no    Cognitive  Cognitive/Neuro/Behavioral: WDL                      Living Environment:   People in home: child(jaqui), dependent, spouse  Reba-wife, 2 daughters and 1 son  Current living Arrangements: house      Able to return to prior arrangements:         Family/Social Support:  Care provided by: self  Provides care for:    Marital Status:   Wife, Children  Reba       Description of Support System: Supportive, Involved         Current Resources:   Patient receiving home care services: No     Community Resources: None  Equipment currently used at home: none  Supplies currently used at home:      Employment/Financial:  Employment Status: employed full-time (states will be not working initially after discharge)        Financial Concerns: none           Does the patient's insurance plan have a 3 day qualifying hospital stay waiver?  No    Lifestyle & Psychosocial Needs:  Social Determinants of Health     Food Insecurity: Not on file   Depression: Not at risk (1/9/2019)    PHQ-2     PHQ-2 Score: 0   Housing Stability: Not on file   Tobacco Use: Not on file (2/17/2018)   Financial Resource Strain: Not on file   Alcohol Use: Not on file   Transportation Needs: Not on file   Physical Activity: Not on file   Interpersonal Safety: Not on file   Stress: Not on file   Social Connections: Not on file       Functional Status:  Prior to admission patient needed assistance:    Dependent ADLs:: Independent  Dependent IADLs:: Independent       Mental Health Status:  Mental Health Status: No Current Concerns       Chemical Dependency Status:  Chemical Dependency Status: No Current Concerns             Values/Beliefs:  Spiritual, Cultural Beliefs, Congregational Practices, Values that affect care:                 Additional Information:  Consult for discharge planning.  Met with pt and family in room.  Discussed therapy recommending home care PT and physician ordered for discharge.  Discussed home bound status for home care and pt stated he will not be working initially after hospitalization until moving better.  Pt agreeable to referral being sent for home care agency-referral sent.  Discussed may not have accepting agency prior to discharge and that home care [facility] will follow up with patient after discharge.    Verified pt primary provider.  Pt prefers to make his own hospital follow up.    Faith Yang RN, BS  Care Coordinator  [email or web address]

## 2024-02-06 NOTE — PROVIDER NOTIFICATION
MD Notification    Notified Person: MD    Notified Person Name: chantel    Notification Date/Time: 2/6/24; 1352    Notification Interaction: vocera    Purpose of Notification: can you please prescribe Zofran for this patient? He would like it sent to the [facility] on [address] [id number] in [place].     Orders Received:    Comments:

## 2024-02-06 NOTE — DISCHARGE SUMMARY
[facility]  Hospitalist Discharge Summary      Date of Admission:  2/4/2024  Date of Discharge:  2/6/2024  Discharging Provider: Cece Macedo MD    Discharge Diagnoses   Acute on chronic low back pain  Hx of lumbar radiculopathy s/p lumbar fusion   Constipation  Chronic iron deficiency anemia  Hx of DVT    Follow-ups Needed After Discharge   Follow-up Appointments     Follow-up and recommended labs and tests       Follow up with primary care provider and Neurosurgery team at [facility] [place] within   1-2 weeks for hospital follow up.          Discharge Disposition   Discharged to home  Condition at discharge: Stable    Hospital Course   Yordy Presley is a 48 year old male with hx of chronic low back pain s/p multiple spine surgeries and prior DVT no longer on anticoagulation who was admitted on 2/4/2024 for acute on chronic back pain    Presented with 1 week history of progressive low back pain with acute worsening on the day of presentation after bending over with limited mobility and inability to ambulate. Denies fall. Was seen a week prior in urgent care, and was discharged with PRN oxycodone.     Has chronic back pain radiating to L leg (all the way to the heel) with remote history of left-sided lumbar fusion 15-20yrs ago. Following the procedure, the back pain improved with occasinoal severe flare ups, however the left leg pain did not improve. Surgeons subsequently removed hardware in the hopes that the left leg pain would improve, but pain persists, and is exacerbated with stnding straight and sleeping flat. Has trialed nerve stimulation, but permanent implantation was not performed as the trial failed to show enough improvement. Has taken gabapentin without relief; pregabalin causes drowsiness      Acute on chronic low back pain  Hx of lumbar radiculopathy s/p lumbar fusion   *No red flag s/sx this admission  *Neurosurgery was consulted and recommended conservative  management  *Medication regimen adjusted this admission; while he continues to have pain with limitation in his mobility, there he has been improved. He will be discharged with this adjusted pain regimen and home PT with plans to follow up with spine surgeon at [facility]  *Pain regimen at discharge:  - Dexamethasone taper  - APAP 975 mg TID  - Daily lidocaine patches x2  - Flexeril 10 mg TID   - Dilaudid 2-4 mg q6h prn    Constipation  *Bowel regimen ordered at discharge    Chronic iron deficiency anemia  *Recently hospitalized at [facility] (1/31-2/1/24) for symptomatic anemia requiring transfusion and also treated with IV iron. Empirically initiated on PPI BID and discharged with plan for outpatient EGD. Hgb 6.9 on that admission, 7.8 at discharge  *Hgb 8.8 on this admission  - Continues on PTA PPI    Hx of DVT  *No longer on anticoagulation  *BLE doppler US during recent hospitalization negative for DVT    Consultations This Hospital Stay   NEUROSURGERY IP CONSULT  OCCUPATIONAL THERAPY ADULT IP CONSULT  PHYSICAL THERAPY ADULT IP CONSULT  CARE MANAGEMENT / SOCIAL WORK IP CONSULT    Code Status   Full Code    Time Spent on this Encounter   I, Cece Macedo MD, personally saw the patient today and spent 40 minutes discharging this patient.       Cece Macedo MD  [facility] ORTHOPEDICS SPINE  [address] [address]  Phone: [phone]  Fax: [phone]  ______________________________________________________________________    Physical Exam   Vital Signs: Temp: 98.9  F (37.2  C) Temp src: Oral BP: 124/78 Pulse: 93   Resp: 18 SpO2: 99 % O2 Device: None (Room air)    Weight: 195 lbs 0 oz    Constitutional: Sitting up in chair, NAD  HEENT: Sclera white, conjunctiva clear, EOMI, MMM  Respiratory: Breathing non-labored. Lungs CTAB - no wheezes/crackles/rhonchi  Cardiovascular: Heart RRR, no m/r/g. No pedal edema.   GI: +BS. Abd soft/NT  Skin: Warm and dry. No  rash.  Musculoskeletal: Normal muscle bulk and tone  Neurologic: Alert and appropriate. LION  Psychiatric: Calm and cooperative    Primary Care Physician   Sukumar Diane    Discharge Orders      Home Care Referral      Reason for your hospital stay    Back pain - you were started on new medications during this hospitalization     Follow-up and recommended labs and tests     Follow up with primary care provider and Neurosurgery team at [facility] [place] within 1-2 weeks for hospital follow up.     Activity    Your activity upon discharge: activity as tolerated     Diet    Follow this diet upon discharge: Regular diet       Significant Results and Procedures   Most Recent 3 CBC's:  Recent Labs   Lab Test 02/04/24 2210 02/01/24  1403 02/01/24  0742 02/01/24  0011   WBC 6.5  --  5.9 4.9   HGB 8.8* 7.8* 7.7* 6.9*   MCV 80  --  79 79     --  230 249     Most Recent 3 BMP's:  Recent Labs   Lab Test 02/04/24 2210 02/01/24  0859 02/01/24  0742 01/31/24  1518     --  136 138   POTASSIUM 4.0  --  4.2 3.8   CHLORIDE 102  --  106 103   CO2 26  --  22 25   BUN 17.5  --  10.7 11.2   CR 0.79  --  0.78 0.77   ANIONGAP 8  --  8 10   MARISOL 9.3  --  8.5* 9.1   * 102* 101* 102*   ,   Results for orders placed or performed during the hospital encounter of 02/04/24   MR Lumbar Spine w/o & w Contrast    Narrative    EXAM: MR LUMBAR SPINE W/O and W CONTRAST  LOCATION: [facility]  DATE: 2/5/2024    INDICATION: Sudden onset lower back pain with radiculopathy.  COMPARISON: MRI 06/22/2021.  CONTRAST: 9 Gadavist.  TECHNIQUE: Routine Lumbar Spine MRI without and with IV contrast.    FINDINGS:   Nomenclature is based on 5 lumbar type vertebral bodies. Vertebral body height is normal. No pathologic marrow signal or enhancement. There is mild grade I retrolisthesis of L1 on L2. Interbody fusion at L4-L5 and L5-S1. Mild grade I anterolisthesis of   L5 on S1. Posterior instrumented fusion from L4 through S1 with the  hardware on the right side. No hardware on the left side. Decompressive laminectomy at L4-L5 and L5-S1. Normal distal spinal cord and cauda equina with conus medullaris at L1. No   extraspinal abnormality. Unremarkable visualized bony pelvis.    T12-L1: Normal disc height and signal. No herniation. Normal facets. No spinal canal or neural foraminal stenosis.     L1-L2: Mild loss of disc height with disc desiccation. Moderate left central extrusion causes moderate to severe left lateral recess stenosis and possible impingement of the traversing left L2 nerve root. Mild central canal stenosis. Foramina are patent.   Normal facets. Findings have progressed.    L2-L3: Mild grade I retrolisthesis of L2. Disc desiccation. Mild annular disc bulge. Mild facet arthropathy. No central canal or neural foraminal stenosis. Findings have progressed.    L3-L4: Normal disc height and signal. No herniation. Mild to moderate facet arthropathy. No spinal canal or neural foraminal stenosis. Findings at this level are stable.    L4-L5: Interbody fusion. No herniation. Mild right facet arthropathy. Left hemilaminectomy. Left-sided facetectomy. No central canal or neural foraminal stenosis.    L5-S1: Interbody fusion. No herniation. Decompressive laminectomy and left facetectomy. Right facetectomy. No central canal or neural foraminal stenosis.      Impression    IMPRESSION:  1.  At L1-L2 there is a moderate-sized left central disc extrusion which causes moderate to severe left lateral recess stenosis and possible impingement of the traversing left L2 nerve root. Findings at this level have progressed.  2.  Stable postoperative changes of discectomy and fusion at L4-L5 and L5-S1 with posterior instrumented fusion at these levels, decompressive laminectomies, left L4-L5 facetectomy and bilateral facetectomy at L5-S1.                    MR Sacroilliac Joints wwo Contrast    Narrative    EXAM: MR SACROILLIAC JOINTS W/O and W  CONTRAST  LOCATION: [facility]  DATE: 2/5/2024    INDICATION: sudden onset lower back pain with radiculopathy  COMPARISON: MRI lumbar 06/22/2021  TECHNIQUE: Routine. Additional postgadolinium T1 sequences were obtained.  IV CONTRAST: 9 Gadavist    FINDINGS:     SI JOINTS AND BONES:  -No effusion, synovitis, or erosive change. No evidence of fracture. Partial visualization of lumbosacral spinal hardware posterior liliana and screw fixation of right L5-S1, and L5-S1 interbody graft. Artifact from hardware somewhat limits evaluation,   notably, producing artifactual increased signal in the right aspect of the sacrum. Refer to separate lumbar MRI.      SOFT TISSUES:  -Small sacral perineural cyst. No edema, mass, or fluid collection.    MUSCLES:  -Muscle bulk is normal.      Impression    IMPRESSION:  1.  No evidence for sacral fracture or edema. Normal SI joints.   2.  Partial visualization of right L5-S1 posterior spinal fusion hardware producing artifact.       Discharge Medications   Current Discharge Medication List        START taking these medications    Details   dexAMETHasone (DECADRON) 2 MG tablet Take 2 tabs twice daily x 3 days, then 2 tabs daily x 3 days, then 1 tab daily x3 days, then 1/2 tab daily x 3 days.  Qty: 24 tablet, Refills: 0    Associated Diagnoses: Acute bilateral low back pain with left-sided sciatica      HYDROmorphone (DILAUDID) 2 MG tablet Take 1-2 tablets (2-4 mg) by mouth every 6 hours as needed for severe pain  Qty: 28 tablet, Refills: 0    Associated Diagnoses: Acute bilateral low back pain with left-sided sciatica      Lidocaine (LIDOCARE) 4 % Patch Place 2 patches onto the skin every 24 hours To prevent lidocaine toxicity, patient should be patch free for 12 hrs daily.  Qty: 60 patch, Refills: 0    Associated Diagnoses: Acute bilateral low back pain with left-sided sciatica           CONTINUE these medications which have CHANGED    Details   acetaminophen  (TYLENOL) 325 MG tablet Take 3 tablets (975 mg) by mouth 3 times daily  Qty: 100 tablet, Refills: 0    Associated Diagnoses: Acute bilateral low back pain with left-sided sciatica      cyclobenzaprine (FLEXERIL) 10 MG tablet Take 1 tablet (10 mg) by mouth 3 times daily for 7 days and then as needed up to 3 times daily for back spasms  Qty: 40 tablet, Refills: 0    Associated Diagnoses: Acute bilateral low back pain with left-sided sciatica      senna-docusate (SENOKOT-S/PERICOLACE) 8.6-50 MG tablet Take 2 tablets by mouth 2 times daily as needed for constipation  Qty: 60 tablet, Refills: 0    Associated Diagnoses: Constipation, unspecified constipation type           CONTINUE these medications which have NOT CHANGED    Details   olopatadine (PATANOL) 0.1 % ophthalmic solution Place 1 drop into both eyes 2 times daily as needed for allergies      omeprazole (PRILOSEC) 20 MG DR capsule Take 1 capsule (20 mg) by mouth 2 times daily  Qty: 60 capsule, Refills: 0    Associated Diagnoses: Symptomatic anemia      polyethylene glycol (MIRALAX) 17 g packet Take 17 g by mouth 2 times daily as needed for constipation           STOP taking these medications       diazepam (VALIUM) 10 MG tablet Comments:   Reason for Stopping:         HYDROcodone-acetaminophen (NORCO) 5-325 MG tablet Comments:   Reason for Stopping:             Allergies   Allergies   Allergen Reactions    Oxycodone GI Disturbance and Nausea and Vomiting

## 2024-02-08 ENCOUNTER — PATIENT OUTREACH (OUTPATIENT)
Dept: CARE COORDINATION | Facility: CLINIC | Age: 49
End: 2024-02-08
Payer: COMMERCIAL

## 2024-02-08 NOTE — PROGRESS NOTES
"Clinic Care Coordination Contact  [facility]: Post-Discharge Note  SITUATION                                                      Admission:    Admission Date: 02/04/24   Reason for Admission: Lumbar radiculopathy  Discharge:   Discharge Date: 02/06/24  Discharge Diagnosis: Lumbar radiculopathy    BACKGROUND                                                      Per hospital discharge summary and inpatient provider notes:  Yordy Presley is a 48 year old male with hx of chronic low back pain s/p multiple spine surgeries and prior DVT no longer on anticoagulation who was admitted on 2/4/2024 for acute on chronic back pain     ASSESSMENT           Discharge Assessment  How are you doing now that you are home?: \" Better \"  How are your symptoms? (Red Flag symptoms escalate to triage hotline per guidelines): Improved  Do you feel your condition is stable enough to be safe at home until your provider visit?: Yes  Does the patient have their discharge instructions? : Yes  Does the patient have questions regarding their discharge instructions? : No  Were you started on any new medications or were there changes to any of your previous medications? : Yes  Does the patient have all of their medications?: Yes  Do you have questions regarding any of your medications? : No  Do you have all of your needed medical supplies or equipment (DME)?  (i.e. oxygen tank, CPAP, cane, etc.): Yes  Discharge follow-up appointment scheduled within 14 calendar days? : Yes  Discharge Follow Up Appointment Date: 02/08/24  Discharge Follow Up Appointment Scheduled with?: Primary Care Provider    Post-op ([facility] Only)  If the patient had a surgery or procedure, do they have any questions for a nurse?: No             PLAN                                                      Outpatient Plan:    Follow up with primary care provider and Neurosurgery team at [facility] within 1-2 weeks for hospital follow up.          Activity     Your activity upon " discharge: activity as tolerated          Diet     Follow this diet upon discharge: Regular diet       No future appointments.      For any urgent concerns, please contact our 24 hour nurse triage line: [phone] ([phone])         Lelo Freeman MA

## 2024-11-09 ENCOUNTER — HEALTH MAINTENANCE LETTER (OUTPATIENT)
Age: 49
End: 2024-11-09